# Patient Record
Sex: FEMALE | Race: WHITE | NOT HISPANIC OR LATINO | Employment: FULL TIME | URBAN - METROPOLITAN AREA
[De-identification: names, ages, dates, MRNs, and addresses within clinical notes are randomized per-mention and may not be internally consistent; named-entity substitution may affect disease eponyms.]

---

## 2017-11-30 ENCOUNTER — OFFICE VISIT (OUTPATIENT)
Dept: CARDIOLOGY | Facility: CLINIC | Age: 32
End: 2017-11-30

## 2017-11-30 VITALS
HEIGHT: 69 IN | SYSTOLIC BLOOD PRESSURE: 124 MMHG | WEIGHT: 172 LBS | DIASTOLIC BLOOD PRESSURE: 78 MMHG | BODY MASS INDEX: 25.48 KG/M2 | HEART RATE: 59 BPM

## 2017-11-30 DIAGNOSIS — Q21.12 PFO (PATENT FORAMEN OVALE): ICD-10-CM

## 2017-11-30 DIAGNOSIS — O29.119: Primary | ICD-10-CM

## 2017-11-30 PROCEDURE — 99204 OFFICE O/P NEW MOD 45 MIN: CPT | Performed by: INTERNAL MEDICINE

## 2017-11-30 PROCEDURE — 93000 ELECTROCARDIOGRAM COMPLETE: CPT | Performed by: INTERNAL MEDICINE

## 2017-11-30 NOTE — PROGRESS NOTES
Janett Coronado  1985  Date of Office Visit: 2017  Encounter Provider: Dameon Haynes MD  Place of Service: [facility] CARDIOLOGY      CHIEF COMPLAINT:  Asystolic arrest during delivery  Patent foramen Ovale    HISTORY OF PRESENT ILLNESS:  Dr. Ochoa,     I had the pleasure of seeing your patient, Janett Coronado, as a self-referral today secondary to an asystolic arrest during anesthesia during childbirth.  She was admitted to [facility] in 10/2017.  She underwent an emergent primary low transverse  section.  This was performed secondary to persistent fetal bradycardia and breech presentation.  After the uterus was manipulated, the patient had approximately 30-45 seconds of asystole and per her report received chest compressions.  I am unclear whether she received epinephrine.  She had returned to a normal sinus rhythm.  She was stable postoperatively after that episode.  Since that time, she denies any issues including chest pain or dyspnea on exertion.  Prior to her  section, in July she had had an echocardiogram secondary to what sounds to be symptoms due to migraine headache.  Her echocardiogram revealed a normal left ventricular size and systolic function with an ejection fraction of 65-70%.  She had a small patent foramen ovale with no shunting at baseline, but a positive bubble study with Valsalva.          Review of Systems   Constitution: Negative for fever, weakness and malaise/fatigue.   HENT: Negative for nosebleeds and sore throat.    Eyes: Negative for blurred vision and double vision.   Cardiovascular: Negative for chest pain, claudication, palpitations and syncope.   Respiratory: Negative for cough, shortness of breath and snoring.    Endocrine: Negative for cold intolerance, heat intolerance and polydipsia.   Skin: Negative for itching, poor wound healing and rash.   Musculoskeletal: Negative for joint pain, joint swelling,  "muscle weakness and myalgias.   Gastrointestinal: Negative for abdominal pain, melena, nausea and vomiting.   Neurological: Negative for light-headedness, loss of balance, seizures and vertigo.   Psychiatric/Behavioral: Negative for altered mental status and depression.          Past Medical History:   Diagnosis Date   • Difficulty with speech    • Heart murmur    • Hypertension     during labor   • PFO (patent foramen ovale)    • Urinary tract infection    • Vision changes        The following portions of the patient's history were reviewed and updated as appropriate: Social history , Family history and Surgical history     No current outpatient prescriptions on file prior to visit.     No current facility-administered medications on file prior to visit.        Allergies   Allergen Reactions   • No Known Drug Allergy        Vitals:    11/30/17 1332   BP: 124/78   Pulse: 59   Weight: 172 lb (78 kg)   Height: 69\" (175.3 cm)     Physical Exam   Constitutional: She is oriented to person, place, and time. She appears well-developed and well-nourished.   HENT:   Head: Normocephalic and atraumatic.   Eyes: Conjunctivae and EOM are normal. No scleral icterus.   Neck: Normal range of motion. Neck supple. Normal carotid pulses, no hepatojugular reflux and no JVD present. Carotid bruit is not present. No tracheal deviation present. No thyromegaly present.   Cardiovascular: Normal rate and regular rhythm.  Exam reveals no gallop and no friction rub.    No murmur heard.  Pulses:       Carotid pulses are 2+ on the right side, and 2+ on the left side.       Radial pulses are 2+ on the right side, and 2+ on the left side.        Femoral pulses are 2+ on the right side, and 2+ on the left side.       Dorsalis pedis pulses are 2+ on the right side, and 2+ on the left side.        Posterior tibial pulses are 2+ on the right side, and 2+ on the left side.   Pulmonary/Chest: Breath sounds normal. No respiratory distress. She has no " decreased breath sounds. She has no wheezes. She has no rhonchi. She has no rales. She exhibits no tenderness.   Abdominal: Soft. Bowel sounds are normal. She exhibits no distension. There is no tenderness. There is no rebound.   Musculoskeletal: She exhibits no edema or deformity.   Neurological: She is alert and oriented to person, place, and time. She has normal strength. No sensory deficit.   Skin: No rash noted. No erythema.   Psychiatric: She has a normal mood and affect. Her behavior is normal.     No components found for: CBC  No results found for: CMP  No components found for: LIPID  No results found for: BMP      ECG 12 Lead  Date/Time: 2017 1:55 PM  Performed by: ELBA AMBRIZ  Authorized by: ELBA AMBRIZ   Comparison: compared with previous ECG from 2017  Rhythm: sinus rhythm  Rate: normal  Conduction: conduction normal  ST Segments: ST segments normal  T Waves: T waves normal  QRS axis: normal  Clinical impression: normal ECG            DISCUSSION/SUMMARYEmelyn is a very pleasant 32-year-old female with a medical history of migrainous headaches with aura, patent foramen ovale with no shunting at rest, and a positive bubble study with Valsalva who had what sounds to be an asystolic arrest for a short period of time with manipulation of her uterus during  section and anesthesia.  Since that time, she has been asymptomatic.  She has no significant chest pain or dyspnea on exertion.      Asystolic arrest; most likely due to uterine manipulation and anesthesia.  I will go ahead and get a repeat transthoracic echocardiogram on her.  This would be as she is closer to her delivery date in October and we would want to rule out a peripartum cardiomyopathy that may have occurred around the time of delivery.  In the setting of a normal EKG, I do not think any additional workup should happen after this.

## 2017-12-07 ENCOUNTER — APPOINTMENT (OUTPATIENT)
Dept: CARDIOLOGY | Facility: HOSPITAL | Age: 32
End: 2017-12-07
Attending: INTERNAL MEDICINE

## 2017-12-13 ENCOUNTER — HOSPITAL ENCOUNTER (OUTPATIENT)
Dept: CARDIOLOGY | Facility: HOSPITAL | Age: 32
Discharge: HOME | End: 2017-12-13
Attending: INTERNAL MEDICINE | Admitting: INTERNAL MEDICINE

## 2017-12-13 VITALS
WEIGHT: 172 LBS | SYSTOLIC BLOOD PRESSURE: 120 MMHG | DIASTOLIC BLOOD PRESSURE: 70 MMHG | BODY MASS INDEX: 25.48 KG/M2 | HEART RATE: 54 BPM | HEIGHT: 69 IN

## 2017-12-13 DIAGNOSIS — O29.119: ICD-10-CM

## 2017-12-13 DIAGNOSIS — Q21.12 PFO (PATENT FORAMEN OVALE): ICD-10-CM

## 2017-12-13 LAB
ASCENDING AORTA: 2.8 CM
BH CV ECHO MEAS - ACS: 2.4 CM
BH CV ECHO MEAS - AO MAX PG (FULL): 3.3 MMHG
BH CV ECHO MEAS - AO MAX PG: 6.3 MMHG
BH CV ECHO MEAS - AO MEAN PG (FULL): 2.5 MMHG
BH CV ECHO MEAS - AO MEAN PG: 3.9 MMHG
BH CV ECHO MEAS - AO ROOT AREA (BSA CORRECTED): 1.6
BH CV ECHO MEAS - AO ROOT AREA: 7.3 CM^2
BH CV ECHO MEAS - AO ROOT DIAM: 3 CM
BH CV ECHO MEAS - AO V2 MAX: 125.7 CM/SEC
BH CV ECHO MEAS - AO V2 MEAN: 94.8 CM/SEC
BH CV ECHO MEAS - AO V2 VTI: 30.7 CM
BH CV ECHO MEAS - AVA(I,A): 2.1 CM^2
BH CV ECHO MEAS - AVA(I,D): 2.1 CM^2
BH CV ECHO MEAS - AVA(V,A): 2.4 CM^2
BH CV ECHO MEAS - AVA(V,D): 2.4 CM^2
BH CV ECHO MEAS - BSA(HAYCOCK): 2 M^2
BH CV ECHO MEAS - BSA: 1.9 M^2
BH CV ECHO MEAS - BZI_BMI: 25.4 KILOGRAMS/M^2
BH CV ECHO MEAS - BZI_METRIC_HEIGHT: 175.3 CM
BH CV ECHO MEAS - BZI_METRIC_WEIGHT: 78 KG
BH CV ECHO MEAS - CONTRAST EF (2CH): 52.5 ML/M^2
BH CV ECHO MEAS - CONTRAST EF 4CH: 57.5 ML/M^2
BH CV ECHO MEAS - EDV(MOD-SP2): 80 ML
BH CV ECHO MEAS - EDV(MOD-SP4): 87 ML
BH CV ECHO MEAS - EDV(TEICH): 122.8 ML
BH CV ECHO MEAS - EF(CUBED): 65.1 %
BH CV ECHO MEAS - EF(MOD-SP2): 52.5 %
BH CV ECHO MEAS - EF(MOD-SP4): 57.5 %
BH CV ECHO MEAS - EF(TEICH): 56.3 %
BH CV ECHO MEAS - ESV(MOD-SP2): 38 ML
BH CV ECHO MEAS - ESV(MOD-SP4): 37 ML
BH CV ECHO MEAS - ESV(TEICH): 53.7 ML
BH CV ECHO MEAS - FS: 29.6 %
BH CV ECHO MEAS - IVS/LVPW: 1.2
BH CV ECHO MEAS - IVSD: 1 CM
BH CV ECHO MEAS - LAT PEAK E' VEL: 18 CM/SEC
BH CV ECHO MEAS - LV DIASTOLIC VOL/BSA (35-75): 44.9 ML/M^2
BH CV ECHO MEAS - LV MASS(C)D: 169.9 GRAMS
BH CV ECHO MEAS - LV MASS(C)DI: 87.7 GRAMS/M^2
BH CV ECHO MEAS - LV MAX PG: 3 MMHG
BH CV ECHO MEAS - LV MEAN PG: 1.4 MMHG
BH CV ECHO MEAS - LV SYSTOLIC VOL/BSA (12-30): 19.1 ML/M^2
BH CV ECHO MEAS - LV V1 MAX: 87.3 CM/SEC
BH CV ECHO MEAS - LV V1 MEAN: 53.4 CM/SEC
BH CV ECHO MEAS - LV V1 VTI: 18.4 CM
BH CV ECHO MEAS - LVIDD: 5.1 CM
BH CV ECHO MEAS - LVIDS: 3.6 CM
BH CV ECHO MEAS - LVLD AP2: 8.5 CM
BH CV ECHO MEAS - LVLD AP4: 7.2 CM
BH CV ECHO MEAS - LVLS AP2: 7.6 CM
BH CV ECHO MEAS - LVLS AP4: 6.2 CM
BH CV ECHO MEAS - LVOT AREA (M): 3.5 CM^2
BH CV ECHO MEAS - LVOT AREA: 3.5 CM^2
BH CV ECHO MEAS - LVOT DIAM: 2.1 CM
BH CV ECHO MEAS - LVPWD: 0.86 CM
BH CV ECHO MEAS - MED PEAK E' VEL: 11 CM/SEC
BH CV ECHO MEAS - MR MAX PG: 57.8 MMHG
BH CV ECHO MEAS - MR MAX VEL: 380.1 CM/SEC
BH CV ECHO MEAS - MV A DUR: 0.13 SEC
BH CV ECHO MEAS - MV A MAX VEL: 41.9 CM/SEC
BH CV ECHO MEAS - MV DEC SLOPE: 378.1 CM/SEC^2
BH CV ECHO MEAS - MV DEC TIME: 0.22 SEC
BH CV ECHO MEAS - MV E MAX VEL: 82.5 CM/SEC
BH CV ECHO MEAS - MV E/A: 2
BH CV ECHO MEAS - MV MAX PG: 3.1 MMHG
BH CV ECHO MEAS - MV MEAN PG: 1.1 MMHG
BH CV ECHO MEAS - MV P1/2T MAX VEL: 83.9 CM/SEC
BH CV ECHO MEAS - MV P1/2T: 65 MSEC
BH CV ECHO MEAS - MV V2 MAX: 87.5 CM/SEC
BH CV ECHO MEAS - MV V2 MEAN: 49.3 CM/SEC
BH CV ECHO MEAS - MV V2 VTI: 30.7 CM
BH CV ECHO MEAS - MVA P1/2T LCG: 2.6 CM^2
BH CV ECHO MEAS - MVA(P1/2T): 3.4 CM^2
BH CV ECHO MEAS - MVA(VTI): 2.1 CM^2
BH CV ECHO MEAS - PA ACC TIME: 0.12 SEC
BH CV ECHO MEAS - PA MAX PG (FULL): 2.7 MMHG
BH CV ECHO MEAS - PA MAX PG: 4.3 MMHG
BH CV ECHO MEAS - PA PR(ACCEL): 26.7 MMHG
BH CV ECHO MEAS - PA V2 MAX: 103.2 CM/SEC
BH CV ECHO MEAS - PULM A REVS DUR: 0.1 SEC
BH CV ECHO MEAS - PULM A REVS VEL: 29.3 CM/SEC
BH CV ECHO MEAS - PULM DIAS VEL: 49.9 CM/SEC
BH CV ECHO MEAS - PULM S/D: 1.2
BH CV ECHO MEAS - PULM SYS VEL: 58.6 CM/SEC
BH CV ECHO MEAS - PVA(V,A): 1.7 CM^2
BH CV ECHO MEAS - PVA(V,D): 1.7 CM^2
BH CV ECHO MEAS - QP/QS: 0.53
BH CV ECHO MEAS - RAP SYSTOLE: 3 MMHG
BH CV ECHO MEAS - RV MAX PG: 1.5 MMHG
BH CV ECHO MEAS - RV MEAN PG: 0.78 MMHG
BH CV ECHO MEAS - RV V1 MAX: 61.6 CM/SEC
BH CV ECHO MEAS - RV V1 MEAN: 39.8 CM/SEC
BH CV ECHO MEAS - RV V1 VTI: 11.9 CM
BH CV ECHO MEAS - RVOT AREA: 2.9 CM^2
BH CV ECHO MEAS - RVOT DIAM: 1.9 CM
BH CV ECHO MEAS - RVSP: 25 MMHG
BH CV ECHO MEAS - SI(AO): 115.2 ML/M^2
BH CV ECHO MEAS - SI(CUBED): 44.1 ML/M^2
BH CV ECHO MEAS - SI(LVOT): 33.3 ML/M^2
BH CV ECHO MEAS - SI(MOD-SP2): 21.7 ML/M^2
BH CV ECHO MEAS - SI(MOD-SP4): 25.8 ML/M^2
BH CV ECHO MEAS - SI(TEICH): 35.7 ML/M^2
BH CV ECHO MEAS - SUP REN AO DIAM: 1.3 CM
BH CV ECHO MEAS - SV(AO): 223.2 ML
BH CV ECHO MEAS - SV(CUBED): 85.5 ML
BH CV ECHO MEAS - SV(LVOT): 64.5 ML
BH CV ECHO MEAS - SV(MOD-SP2): 42 ML
BH CV ECHO MEAS - SV(MOD-SP4): 50 ML
BH CV ECHO MEAS - SV(RVOT): 34.5 ML
BH CV ECHO MEAS - SV(TEICH): 69.2 ML
BH CV ECHO MEAS - TAPSE (>1.6): 2.4 CM2
BH CV ECHO MEAS - TR MAX VEL: 269 CM/SEC
BH CV XLRA - RV BASE: 3.2 CM
BH CV XLRA - TDI S': 13 CM/SEC
E/E' RATIO: 6
LEFT ATRIUM VOLUME INDEX: 31 ML/M2
LV EF 2D ECHO EST: 57 %
SINUS: 2.9 CM
STJ: 2.4 CM

## 2017-12-13 PROCEDURE — 0399T HC MYOCARDL STRAIN IMAG QUAN ASSMT PER SESS: CPT

## 2017-12-13 PROCEDURE — 0399T ADULT TRANSTHORACIC ECHO COMPLETE W/ CONT IF NECESSARY PER PROTOCOL: CPT | Performed by: INTERNAL MEDICINE

## 2017-12-13 PROCEDURE — 93303 ECHO TRANSTHORACIC: CPT | Performed by: INTERNAL MEDICINE

## 2017-12-13 PROCEDURE — 93306 TTE W/DOPPLER COMPLETE: CPT

## 2018-06-19 ENCOUNTER — OFFICE VISIT (OUTPATIENT)
Dept: FAMILY MEDICINE CLINIC | Facility: CLINIC | Age: 33
End: 2018-06-19

## 2018-06-19 VITALS
BODY MASS INDEX: 24.88 KG/M2 | WEIGHT: 168 LBS | OXYGEN SATURATION: 98 % | DIASTOLIC BLOOD PRESSURE: 78 MMHG | SYSTOLIC BLOOD PRESSURE: 112 MMHG | HEART RATE: 80 BPM | HEIGHT: 69 IN | RESPIRATION RATE: 16 BRPM | TEMPERATURE: 98.3 F

## 2018-06-19 DIAGNOSIS — R10.32 LEFT INGUINAL PAIN: ICD-10-CM

## 2018-06-19 DIAGNOSIS — J40 BRONCHITIS: Primary | ICD-10-CM

## 2018-06-19 PROCEDURE — 99213 OFFICE O/P EST LOW 20 MIN: CPT | Performed by: FAMILY MEDICINE

## 2018-06-19 RX ORDER — METHYLPREDNISOLONE 4 MG/1
TABLET ORAL
Qty: 21 EACH | Refills: 0 | Status: SHIPPED | OUTPATIENT
Start: 2018-06-19 | End: 2020-02-21

## 2018-06-19 NOTE — PROGRESS NOTES
Problem List Items Addressed This Visit     None      Visit Diagnoses     Bronchitis    -  Primary    Relevant Medications    MethylPREDNISolone (MEDROL, MORE,) 4 MG tablet    Left inguinal pain        Relevant Orders    Ambulatory Referral to Physical Therapy Evaluate and treat          Reassurance regarding lip. Used balm       Return if symptoms worsen or fail to improve.    Janett Coronado is a 32 y.o. female being seen in our office today for Cough (Took zpack, felt better but is now getting worse); Generalized Body Aches; and Nasal Congestion                 She  reports that she has never smoked. She has never used smokeless tobacco. She reports that she drinks alcohol. She reports that she does not use drugs.             HPI She had some difficulty breathing with a recent URI. Was seen in [facility] with this and begun on a Zpack. No fever -- still with a little bit of congestion. Cough is worse in the am when she gets up and the late afternoon to evening. Not taking an meds for it. She is nursing as well. She has an 8 month old. No wheezing but doesn't feel like she is breathing as well. She has been more achey lately, since Sunday. All the symptoms began about two and a half to three weeks ago.              The following portions of the patient's history were reviewed and updated as appropriate:PMHroutine: Social history , Allergies, Current Medications, Active Problem List and Health Maintenance            Review of Systems   Constitutional: Positive for fatigue. Negative for activity change, appetite change, chills, fever and unexpected weight change.   HENT: Positive for postnasal drip. Negative for congestion, ear pain, hearing loss, nosebleeds, rhinorrhea and sore throat.         A little dryness along the lip line on the upper lip, laterally   Eyes: Negative for pain, redness and visual disturbance.   Respiratory: Positive for cough and shortness of breath. Negative for wheezing.    Cardiovascular: Negative  for chest pain, palpitations and leg swelling.   Gastrointestinal: Negative for abdominal pain, blood in stool, constipation, diarrhea, nausea and vomiting.   Endocrine: Negative for cold intolerance and heat intolerance.   Genitourinary: Negative for difficulty urinating, dysuria, frequency, hematuria, pelvic pain, urgency and vaginal discharge.   Musculoskeletal: Negative for back pain and joint swelling. Arthralgias: having some pain in the left inguinal region, sl radiating into the thigh. No bulge, seems to be movement related.    Skin: Negative for rash and wound.   Neurological: Negative for dizziness, weakness, numbness and headaches.   Hematological: Does not bruise/bleed easily.   Psychiatric/Behavioral: Negative for dysphoric mood, sleep disturbance and suicidal ideas. The patient is not nervous/anxious.                  BP Readings from Last 1 Encounters:   06/19/18 112/78     Wt Readings from Last 3 Encounters:   06/19/18 76.2 kg (168 lb)   06/08/18 77.1 kg (170 lb)   12/13/17 78 kg (172 lb)   Body mass index is 24.81 kg/m².                 Physical Exam   Constitutional: She appears well-developed and well-nourished.   HENT:   Head: Normocephalic and atraumatic.   Right Ear: Tympanic membrane, external ear and ear canal normal.   Left Ear: Tympanic membrane, external ear and ear canal normal.   Mouth/Throat: Oropharynx is clear and moist.       Eyes: Conjunctivae are normal. Right eye exhibits no discharge. Left eye exhibits no discharge.   Neck: Normal range of motion. Neck supple. No thyromegaly present.   Cardiovascular: Normal rate, regular rhythm and normal heart sounds.    Pulmonary/Chest: Effort normal and breath sounds normal. No respiratory distress. She has no wheezes. She has no rales.   Lymphadenopathy:     She has no cervical adenopathy.   Skin: Skin is warm and dry.   Psychiatric: She has a normal mood and affect. Judgment normal.   Vitals reviewed.                  [facility] notes

## 2018-07-16 ENCOUNTER — TREATMENT (OUTPATIENT)
Dept: PHYSICAL THERAPY | Facility: CLINIC | Age: 33
End: 2018-07-16

## 2018-07-16 DIAGNOSIS — M25.552 PAIN OF LEFT HIP JOINT: Primary | ICD-10-CM

## 2018-07-16 PROCEDURE — 97161 PT EVAL LOW COMPLEX 20 MIN: CPT | Performed by: PHYSICAL THERAPIST

## 2018-07-16 PROCEDURE — 97110 THERAPEUTIC EXERCISES: CPT | Performed by: PHYSICAL THERAPIST

## 2018-07-16 NOTE — PROGRESS NOTES
Physical Therapy Initial Evaluation and Plan of Care    Patient: Janett Coronado   : 1985  Diagnosis/ICD-10 Code:  Pain of left hip joint [M25.552]  Referring practitioner: Regla Ochoa MD  Past medical Hx reviewed: 2018  Subjective Evaluation    History of Present Illness  Date of onset: 2018  Mechanism of injury: I began having problems in the L hip about 6 months ago with no known injury I can think of.  The hip did gradually worsen over time before I was seen by my doctor to check it out.  The pain seems worse after sitting too long (>60 min) and then standing.  I'll experience pain in the front of the hip that can be very sharp then fades out.    I don't have any issue sleeping, but I can have some discomfort with the first couple of steps of walking.  I can also experience and dull pain in the hip just from being up and walking at times.  I did have a lot of hip pain after both of my pregnancies and I do notice that my hip does pop frequently (several times /day) but the pop isn't painful.   I started taking some IBU this past sat. 18 and it seemed to help.          Patient Occupation: Office job. Full time.  Frequently lifting /carrying some weighted objects up to 30 lbs.     Precautions and Work Restrictions: None Quality of life: excellent    Pain  No pain reported  Current pain ratin  At best pain ratin  At worst pain ratin  Location: L anterior lateral hip.    Quality: dull ache and sharp  Relieving factors: medications (IBU )  Aggravating factors: prolonged positioning and ambulation  Progression: no change    Social Support  Lives in: multiple-level home  Lives with: spouse and young children    Diagnostic Tests  No diagnostic tests performed    Treatments  Previous treatment: chiropractic (For pre birth hip pain)  Patient Goals  Patient goals for therapy: decreased pain, increased strength, return to work, return to sport/leisure activities and independence with  ADLs/IADLs       Objective       Palpation   Left   Tenderness of the rectus femoris and TFL.     Active Range of Motion     Lumbar   Flexion: WFL  Extension: WFL  Left lateral flexion: WFL  Right lateral flexion: WFL  Left rotation: WFL  Right rotation: WFL    Strength/Myotome Testing     Left Hip   Planes of Motion   Flexion: 4+ (slight hip pain)  Extension: 4+  Abduction: 5  Adduction: 5  External rotation: 4  Internal rotation: 5    Isolated Muscles   Gluteus minimus: 4+  TFL: 4+    Right Hip   Planes of Motion   Flexion: 5  Extension: 5  External rotation: 5    Additional Strength Details  VMO test position noted some pain      Tests     Left Hip   Positive scour (slight pain with hip in flexed, adducted and IR. ).   Negative EMMA, femoral nerve tension and Daphne.   Bala: Negative.   Modified Bala: Negative.   SLR: Negative (no pain independently, but with resistance. ).     Ambulation   Weight-Bearing Status   Weight-Bearing Status (Left): full weight bearing   Assistive device used: none    Observational Gait   Gait: within functional limits     Functional Assessment     Single Leg Stance   Left: 30 (on foam: 12 sec.  ) seconds  Right single leg stance time: 12 sec.     Comments  Sit to stand: 10x < 30 sec and no pain in the hip reported.      Assessment & Plan     Assessment  Impairments: activity intolerance, impaired physical strength, lacks appropriate home exercise program and pain with function  Assessment details: Pt presents to PT with symptoms consistent with some hip instability.  She may be experiencing some mm guarding for the hip that is exacerbated by prolonged periods of muscle shortening (sitting) or exertion (prolonged walking).  She has recently given birth and would benefit from stability training of the hip and pelvic girdle to reduce pain and stabilize the hip.    Prognosis: good  Prognosis details: SHORT TERM GOALS: 2 weeks   1.  Patient to be compliant with HEP and demo good  efficiency with TE  2.  Pt will report minimal-no tenderness to palpation with firm pressure.   6.  Pt. Able to ambulate up to 30 min with pain < 3/10 with acceptable pattern.      LONG TERM GOALS: 4-5 weeks   1.  Pt. to score > 100% perceived ability on LEFS  2.  Pain level < 0/10 in hips with all activities including sitting > 60 min continuously.   3.  Hip  AROM to WNL to allow for return to ADL's/IADLS and functional activities without increased symptoms  4. Hip strength to 5/5  to allow for pushing, pulling and more strenuous activities to occur without pain.  Walk > 60 min.  no pain.  5. No palpable tenderness to the hip.   Functional Limitations: walking, uncomfortable because of pain and standing  Goals  Plan Goals:        Plan  Therapy options: will be seen for skilled physical therapy services  Planned modality interventions: cryotherapy, iontophoresis, TENS, thermotherapy (hydrocollator packs) and ultrasound  Planned therapy interventions: abdominal trunk stabilization, body mechanics training, balance/weight-bearing training, flexibility, functional ROM exercises, home exercise program, joint mobilization, manual therapy, neuromuscular re-education, postural training, soft tissue mobilization, spinal/joint mobilization, strengthening, stretching and therapeutic activities  Frequency: 1x week  Duration in weeks: 5  Treatment plan discussed with: patient    Manual Therapy:    -     mins  44155;  Therapeutic Exercise:    20     mins  72016;     Neuromuscular Shabana:    -    mins  93066;    Therapeutic Activity:     -     mins  76961;     Gait Training:      -     mins  58615;     Ultrasound:     -     mins  84637;    Electrical Stimulation:    -     mins  69760 ( );  Dry Needling     -     mins self-pay    Timed Treatment:   20   mins   Total Treatment:     60   mins    PT SIGNATURE: JANNET Atkinson License #: [id number]    DATE TREATMENT INITIATED: 7/16/2018    Initial Certification  Certification  Period: 10/14/2018  I certify that the therapy services are furnished while this patient is under my care.  The services outlined above are required by this patient, and will be reviewed every 90 days.     PHYSICIAN: Regla Ochoa MD      DATE:     Please sign and return via fax to [phone].. Thank you, [facility] Physical Therapy.

## 2018-07-25 ENCOUNTER — TREATMENT (OUTPATIENT)
Dept: PHYSICAL THERAPY | Facility: CLINIC | Age: 33
End: 2018-07-25

## 2018-07-25 DIAGNOSIS — M25.552 PAIN OF LEFT HIP JOINT: Primary | ICD-10-CM

## 2018-07-25 PROCEDURE — 97140 MANUAL THERAPY 1/> REGIONS: CPT | Performed by: PHYSICAL THERAPIST

## 2018-07-25 PROCEDURE — 97110 THERAPEUTIC EXERCISES: CPT | Performed by: PHYSICAL THERAPIST

## 2018-07-25 NOTE — PROGRESS NOTES
Physical Therapy Daily Progress Note  Visits:2    Subjective : Janett Coronado reports: I feel like the exercises have already began to help with my hip pain but it is still there if I sit too long an get up.      Objective:   See Exercise, Manual, and Modality Logs for complete treatment.     Assessment/Plan:Pt tolerated treatment well and indicates that the exercises are beneficial.  Her form and completion were good but required some verbal cues for postures.  She demonstrates L anterior capsule restriction noted in prone position with P-A femoral head glide.  Manual intervention improved symptoms. Will initiate WBing stability training next visit.      Progress per Plan of Care and Progress strengthening /stabilization /functional activity.     Manual Therapy:    10     mins  84783;  Therapeutic Exercise:    35     mins  81363;     Neuromuscular Shabana:    -    mins  00110;    Therapeutic Activity:     -     mins  90209;     Gait Training:      -     mins  88099;     Ultrasound:     -     mins  45470;    Electrical Stimulation:    -     mins  46229 ( );  Dry Needling     -     mins self-pay    Timed Treatment:   45   mins   Total Treatment:     45   mins    JANNET Atkinson License #[id number]    Physical Therapist

## 2018-08-01 ENCOUNTER — TREATMENT (OUTPATIENT)
Dept: PHYSICAL THERAPY | Facility: CLINIC | Age: 33
End: 2018-08-01

## 2018-08-01 DIAGNOSIS — M25.552 PAIN OF LEFT HIP JOINT: Primary | ICD-10-CM

## 2018-08-01 PROCEDURE — 97110 THERAPEUTIC EXERCISES: CPT | Performed by: PHYSICAL THERAPIST

## 2018-08-01 PROCEDURE — 97112 NEUROMUSCULAR REEDUCATION: CPT | Performed by: PHYSICAL THERAPIST

## 2018-08-01 NOTE — PROGRESS NOTES
Physical Therapy Daily Progress Note  Visits:3    Subjective : Janett Coronado reports: I'm doing really well.  No new complaints and the exercises are helping.     Objective   See Exercise, Manual, and Modality Logs for complete treatment.     Assessment/Plan:Today's visit was to focus on progression of dynamic stability training for the hip and pelvis.     Progress per Plan of Care, anticipate D/C next visit      Manual Therapy:    -     mins  83257;  Therapeutic Exercise:    35     mins  48678;     Neuromuscular Shabana:    6    mins  94391;    Therapeutic Activity:     -     mins  30895;     Gait Training:      -     mins  05217;     Ultrasound:     -     mins  04651;    Electrical Stimulation:    -     mins  46433 ( );  Dry Needling     -     mins self-pay    Timed Treatment:   41   mins   Total Treatment:     41   mins    JANNET Atkinson License #[id number]    Physical Therapist

## 2018-08-10 ENCOUNTER — TREATMENT (OUTPATIENT)
Dept: PHYSICAL THERAPY | Facility: CLINIC | Age: 33
End: 2018-08-10

## 2018-08-10 DIAGNOSIS — M25.552 PAIN OF LEFT HIP JOINT: Primary | ICD-10-CM

## 2018-08-10 PROCEDURE — 97110 THERAPEUTIC EXERCISES: CPT | Performed by: PHYSICAL THERAPIST

## 2018-08-10 PROCEDURE — 97530 THERAPEUTIC ACTIVITIES: CPT | Performed by: PHYSICAL THERAPIST

## 2018-08-10 NOTE — PROGRESS NOTES
Physical Therapy Discharge Note  Visits:4    Subjective : Janett Coronado reports: I have not been that great about my exercises this week and I can tell that my hip wasn't as good as it was last week.  No pain in the hip.      Objective:   Strength/Myotome Testing      Left Hip   Planes of Motion   Flexion: 5 (slight hip pain)  Extension: 5  Abduction: 5  Adduction: 5  External rotation: 5  Internal rotation: 5     Isolated Muscles   Gluteus minimus: 5  TFL: 5-  See Exercise, Manual, and Modality Logs for complete treatment.     Assessment & Plan       Goals  Plan Goals: GOALS: 2 weeks (all met)   1.  Patient to be compliant with HEP and demo good efficiency with TE  2.  Pt will report minimal-no tenderness to palpation with firm pressure.   6.  Pt. Able to ambulate up to 30 min with pain < 3/10 with acceptable pattern.      LONG TERM GOALS: 4-5 weeks (all met)   1.  Pt. to score > 100% perceived ability on LEFS  2.  Pain level < 0/10 in hips with all activities including sitting > 60 min continuously.   3.  Hip  AROM to WNL to allow for return to ADL's/IADLS and functional activities without increased symptoms  4. Hip strength to 5/5  to allow for pushing, pulling and more strenuous activities to occur without pain.  Walk > 60 min.  no pain.  5. No palpable tenderness to the hip.     Plan  Therapy options: will not be seen for skilled physical therapy services  Treatment plan discussed with: patient  Plan details: Pt was further educated on work ergonomics to sit in chair that is higher to decrease hip flexion angle for prolonged sitting. She understood the reasoning and was pleased with her progress.        Other: D/C to HEP      Manual Therapy:    -     mins  47524;  Therapeutic Exercise:    40     mins  60188;     Neuromuscular Shabana:    5    mins  62054;    Therapeutic Activity:     8     mins  70538;   Tests, measures, PT education   Gait Training:      -     mins  55163;     Ultrasound:     -     mins  12338;     Electrical Stimulation:    -     mins  48703 ( );  Dry Needling     -     mins self-pay    Timed Treatment:   53   mins   Total Treatment:     53   mins    JANNET Atkinson License #[id number]    Physical Therapist

## 2020-02-21 ENCOUNTER — OFFICE VISIT (OUTPATIENT)
Dept: FAMILY MEDICINE CLINIC | Facility: CLINIC | Age: 35
End: 2020-02-21

## 2020-02-21 VITALS
OXYGEN SATURATION: 99 % | SYSTOLIC BLOOD PRESSURE: 128 MMHG | WEIGHT: 134 LBS | HEIGHT: 69 IN | DIASTOLIC BLOOD PRESSURE: 72 MMHG | BODY MASS INDEX: 19.85 KG/M2 | RESPIRATION RATE: 14 BRPM | HEART RATE: 52 BPM

## 2020-02-21 DIAGNOSIS — R73.9 HYPERGLYCEMIA: ICD-10-CM

## 2020-02-21 DIAGNOSIS — E78.2 MODERATE MIXED HYPERLIPIDEMIA NOT REQUIRING STATIN THERAPY: Primary | ICD-10-CM

## 2020-02-21 LAB
CHOLEST SERPL-MCNC: 226 MG/DL
HDLC SERPL-MCNC: 79 MG/DL (ref 40–60)
LDLC SERPL DIRECT ASSAY-MCNC: 134 MG/DL
TRIGL SERPL-MCNC: 64 MG/DL (ref 0–150)

## 2020-02-21 PROCEDURE — 99213 OFFICE O/P EST LOW 20 MIN: CPT | Performed by: FAMILY MEDICINE

## 2020-02-21 NOTE — PROGRESS NOTES
ASSESSMENT AND PLAN     Problem List Items Addressed This Visit        Other    Hyperglycemia    Overview     OVERVIEW:  Noted 2/21/2020 after 105 level with fasting labs at work           Other Visit Diagnoses     Mild mixed hyperlipidemia not requiring statin therapy    -  Primary    Very mild. Recommend continued exercise and a mediterranean style of eating.           Return in about 1 year (around 2/21/2021), or if symptoms worsen or fail to improve.  Patient was given instructions and counseling regarding her condition or for health maintenance advice. Please see specific information pulled into the AVS if appropriate.          SUBJECTIVE  Janett Coronado is a 34 y.o. female being seen in our office today for discuss lab results from cardiochek/work               Social History  She  reports that she has never smoked. She has never used smokeless tobacco. She reports that she drinks alcohol. She reports that she does not use drugs.    History of the Present Illness   HPI patient concerned about her labs done at work. T Cholesterol was 226 and . She had an excellent HDL. She has lost over 30 lbs since she was last seen here. Is exercising regularly. She feels great but was worried about the numbers.   Significant Past History  The following portions of the patient's history were reviewed and updated as appropriate:PMHroutine: Social history , Allergies, Current Medications, Active Problem List and Health Maintenance    Review of Systems   Constitutional: Negative for activity change, appetite change, chills, fatigue, fever and unexpected weight change.   HENT: Negative for congestion, ear pain, hearing loss, nosebleeds, rhinorrhea and sore throat.    Eyes: Negative for pain, redness and visual disturbance.   Respiratory: Negative for cough, shortness of breath and wheezing.    Cardiovascular: Negative for chest pain, palpitations and leg swelling.   Gastrointestinal: Negative for abdominal pain, blood in  stool, constipation, diarrhea, nausea and vomiting.   Endocrine: Negative for cold intolerance and heat intolerance.   Genitourinary: Negative for difficulty urinating, dysuria, frequency, hematuria, pelvic pain, urgency and vaginal discharge.   Musculoskeletal: Negative for arthralgias, back pain and joint swelling.   Skin: Negative for rash and wound.   Neurological: Negative for dizziness, weakness, numbness and headaches.   Hematological: Does not bruise/bleed easily.   Psychiatric/Behavioral: Negative for dysphoric mood, sleep disturbance and suicidal ideas. The patient is not nervous/anxious.    I have reviewed the ROS as documented by the MA. Regla Ochoa MD      OBJECTIVE  Vital Signs          BP Readings from Last 1 Encounters:   02/21/20 128/72     Wt Readings from Last 3 Encounters:   02/21/20 60.8 kg (134 lb)   06/19/18 76.2 kg (168 lb)   06/08/18 77.1 kg (170 lb)   Body mass index is 19.79 kg/m².     Physical Exam   Constitutional: She appears well-developed and well-nourished.   Psychiatric: She has a normal mood and affect. Her behavior is normal. Judgment and thought content normal.   Vitals reviewed.    Data Reviewed       Recent Results (from the past 2016 hour(s))   Lipid Panel    Collection Time: 02/03/20 12:00 AM   Result Value Ref Range    Total Cholesterol 226 mg/dL    HDL Cholesterol 79 (A) 40 - 60 mg/dL    LDL Cholesterol  134 mg/dL    Triglycerides 64 0 - 150 mg/dL

## 2020-11-06 ENCOUNTER — TELEPHONE (OUTPATIENT)
Dept: FAMILY MEDICINE CLINIC | Facility: CLINIC | Age: 35
End: 2020-11-06

## 2020-11-06 ENCOUNTER — TELEMEDICINE (OUTPATIENT)
Dept: FAMILY MEDICINE CLINIC | Facility: CLINIC | Age: 35
End: 2020-11-06

## 2020-11-06 VITALS — HEIGHT: 69 IN | BODY MASS INDEX: 24.14 KG/M2 | TEMPERATURE: 97.9 F | WEIGHT: 163 LBS

## 2020-11-06 DIAGNOSIS — J02.9 SORE THROAT: Primary | ICD-10-CM

## 2020-11-06 DIAGNOSIS — Z20.822 EXPOSURE TO COVID-19 VIRUS: ICD-10-CM

## 2020-11-06 PROCEDURE — 99212 OFFICE O/P EST SF 10 MIN: CPT | Performed by: FAMILY MEDICINE

## 2020-11-06 NOTE — TELEPHONE ENCOUNTER
Caller: Janett Coronado    Relationship to patient: Self    Best call back number: [phone]    Concerns or Questions if Applicable: PT CANNOT FIND ANYWHERE TO BE TESTED BEFORE Sunday, WANTS TO KNOW IF SHE SHOULD QUARANTINE UNTIL THEN, OR IF WE KNOW OF WHERE SHE COULD GET TESTED QUICKER    Travel screen questions: PT HAS A HEADACHE AND SORE THROAT, NO FEVER, COUGH, SOA, OR LOSS OF TASTE OR SMELL. PT IS UNAWARE OF ANY EXPOSURE WITHIN THE LAST TWO WEEKS.

## 2020-11-06 NOTE — PROGRESS NOTES
Assessment and Plan:     Problem List Items Addressed This Visit     None      Visit Diagnoses     Sore throat    -  Primary    Relevant Orders    COVID-19,[facility] ROUTINE, NP/OP SWAB IN TRANSPORT MEDIA OR ESWAB 72 HR TAT - Swab, Nasopharynx    Exposure to COVID-19 virus        Relevant Orders    COVID-19,[facility] ROUTINE, NP/OP SWAB IN TRANSPORT MEDIA OR ESWAB 72 HR TAT - Swab, Nasopharynx        Patient was given instructions and counseling regarding her condition or for health maintenance advice. Please see specific information pulled into the AVS if appropriate.        Janett Coronado is a 35 y.o. female being seen today for Sore Throat and Headache   History of the Present Illness     No known exposure. Began with HA yesterday and sore throat and some chest discomfort. Kids are in . No fever, no loss of taste and smell. Doesn't want to over-react. Came home from work and would like a test that is read before Monday.       Social History  She  reports that she has never smoked. She has never used smokeless tobacco. She reports current alcohol use. She reports that she does not use drugs.    Review of Systems   Constitutional: Negative for fatigue, fever and unexpected weight change.   HENT: Positive for sore throat.    Respiratory: Negative for cough and shortness of breath.    Cardiovascular: Negative for chest pain.   Neurological: Positive for headaches.   Psychiatric/Behavioral: Negative for dysphoric mood. The patient is not nervous/anxious.    I have reviewed the ROS as documented by the MA. Regla Ochoa MD        Vital Signs          BP Readings from Last 1 Encounters:   02/21/20 128/72     Wt Readings from Last 3 Encounters:   11/06/20 73.9 kg (163 lb)   02/21/20 60.8 kg (134 lb)   06/19/18 76.2 kg (168 lb)   Body mass index is 24.07 kg/m².     Physical Exam  Vitals signs reviewed.   Constitutional:       Appearance: Normal appearance. She is well-developed.   Neurological:      Mental Status:  She is alert.   Psychiatric:         Behavior: Behavior normal.         Thought Content: Thought content normal.         Judgment: Judgment normal.

## 2021-01-08 ENCOUNTER — TELEMEDICINE (OUTPATIENT)
Dept: FAMILY MEDICINE CLINIC | Facility: CLINIC | Age: 36
End: 2021-01-08

## 2021-01-08 VITALS — WEIGHT: 164 LBS | BODY MASS INDEX: 24.29 KG/M2 | HEIGHT: 69 IN

## 2021-01-08 DIAGNOSIS — F48.9 MENTAL HEALTH PROBLEM: Primary | ICD-10-CM

## 2021-01-08 PROCEDURE — 99212 OFFICE O/P EST SF 10 MIN: CPT | Performed by: FAMILY MEDICINE

## 2021-01-08 RX ORDER — BUPROPION HYDROCHLORIDE 150 MG/1
TABLET ORAL
COMMUNITY
Start: 2020-12-31 | End: 2021-07-26

## 2021-01-08 NOTE — PROGRESS NOTES
Assessment and Plan:  {Problem List  Visit Diagnosis  Prob List Tab  Medications  [facility]  [facility]  CC  Quality:23}   Problem List Items Addressed This Visit     None      Patient was given instructions and counseling regarding her condition or for health maintenance advice. Please see specific information pulled into the AVS if appropriate.        Janett Coronado is a 35 y.o. female being seen today for ADHD   Subjective   History of the Present Illness   [facility]. She thinks she may have adult ADD. Recently she was on a support group on line with a support group for non-profit group. EAP spoke to mental health. Met with a psychiatrist. 10 min call. She suggested wellburtrin. She filled it but didn't take it.   Social History  She  reports that she has never smoked. She has never used smokeless tobacco. She reports current alcohol use. She reports that she does not use drugs.  Objective   Vital Signs          BP Readings from Last 1 Encounters:   02/21/20 128/72     Wt Readings from Last 3 Encounters:   01/08/21 74.4 kg (164 lb)   11/06/20 73.9 kg (163 lb)   02/21/20 60.8 kg (134 lb)   Body mass index is 24.22 kg/m².     Physical Exam  Vitals signs reviewed.   Constitutional:       Appearance: Normal appearance. She is well-developed.   Neurological:      Mental Status: She is alert.   Psychiatric:         Behavior: Behavior normal.         Thought Content: Thought content normal.         Judgment: Judgment normal.

## 2021-04-16 ENCOUNTER — BULK ORDERING (OUTPATIENT)
Dept: CASE MANAGEMENT | Facility: OTHER | Age: 36
End: 2021-04-16

## 2021-04-16 DIAGNOSIS — Z23 IMMUNIZATION DUE: ICD-10-CM

## 2021-07-26 ENCOUNTER — OFFICE VISIT (OUTPATIENT)
Dept: FAMILY MEDICINE CLINIC | Facility: CLINIC | Age: 36
End: 2021-07-26

## 2021-07-26 VITALS
WEIGHT: 157 LBS | HEIGHT: 69 IN | HEART RATE: 60 BPM | BODY MASS INDEX: 23.25 KG/M2 | OXYGEN SATURATION: 99 % | RESPIRATION RATE: 16 BRPM | SYSTOLIC BLOOD PRESSURE: 142 MMHG | DIASTOLIC BLOOD PRESSURE: 80 MMHG

## 2021-07-26 DIAGNOSIS — R00.2 PALPITATIONS: Primary | ICD-10-CM

## 2021-07-26 PROCEDURE — 99213 OFFICE O/P EST LOW 20 MIN: CPT | Performed by: FAMILY MEDICINE

## 2021-07-26 PROCEDURE — 93000 ELECTROCARDIOGRAM COMPLETE: CPT | Performed by: FAMILY MEDICINE

## 2021-07-26 NOTE — PROGRESS NOTES
Assessment and Plan     Problem List Items Addressed This Visit     None      Visit Diagnoses     Palpitations    -  Primary    Relevant Orders    Holter monitor - 24 hour    T4    TSH    ECG 12 Lead        Patient was given instructions and counseling regarding her condition or for health maintenance advice. Please see specific information pulled into the AVS if appropriate.        Janett is a 35 y.o. being seen today for  Palpitations   Subjective   History of the Present Illness   Beginning the second week of July she started feeling palpitations and sometimes a rapid heart rate. It would happen less than a minute. Was on vacation but when she came back it continues. She is taking dexadrine but she stopped that. She stopped caffeine and she stopped alcohol. Had two beers on Friday and it seemed to make it worse. Worse at night. FH of father with ablation and a fib.   Social History  She  reports that she has never smoked. She has never used smokeless tobacco. She reports current alcohol use. She reports that she does not use drugs.  Objective   Vital Signs        BP Readings from Last 1 Encounters:   07/26/21 142/80     Wt Readings from Last 3 Encounters:   07/26/21 71.2 kg (157 lb)   01/08/21 74.4 kg (164 lb)   11/06/20 73.9 kg (163 lb)   Body mass index is 23.18 kg/m².     Physical Exam  Vitals reviewed.   Constitutional:       Appearance: Normal appearance. She is well-developed and normal weight.   Cardiovascular:      Rate and Rhythm: Normal rate and regular rhythm.      Heart sounds: Normal heart sounds.   Pulmonary:      Effort: Pulmonary effort is normal.      Breath sounds: Normal breath sounds.   Neurological:      Mental Status: She is alert.   Psychiatric:         Behavior: Behavior normal.         Thought Content: Thought content normal.         Judgment: Judgment normal.               ECG 12 Lead    Date/Time: 7/26/2021 4:12 PM  Performed by: Regla Ochoa MD  Authorized by: Regla Ochoa MD    Comparison: not compared with previous ECG   Previous ECG: no previous ECG available  Rhythm: sinus rhythm  Rate: normal  Conduction: conduction normal  ST Segments: ST segments normal  T Waves: T waves normal  QRS axis: normal  Other: no other findings    Clinical impression: normal ECG

## 2021-07-28 ENCOUNTER — CLINICAL SUPPORT (OUTPATIENT)
Dept: FAMILY MEDICINE CLINIC | Facility: CLINIC | Age: 36
End: 2021-07-28

## 2021-07-28 VITALS — DIASTOLIC BLOOD PRESSURE: 68 MMHG | SYSTOLIC BLOOD PRESSURE: 120 MMHG

## 2022-04-20 ENCOUNTER — OFFICE VISIT (OUTPATIENT)
Dept: FAMILY MEDICINE CLINIC | Facility: CLINIC | Age: 37
End: 2022-04-20

## 2022-04-20 VITALS
HEIGHT: 69 IN | BODY MASS INDEX: 22.36 KG/M2 | RESPIRATION RATE: 16 BRPM | OXYGEN SATURATION: 96 % | SYSTOLIC BLOOD PRESSURE: 118 MMHG | WEIGHT: 151 LBS | HEART RATE: 69 BPM | DIASTOLIC BLOOD PRESSURE: 70 MMHG

## 2022-04-20 DIAGNOSIS — R39.15 URINARY URGENCY: ICD-10-CM

## 2022-04-20 DIAGNOSIS — R10.31 RIGHT INGUINAL PAIN: Primary | ICD-10-CM

## 2022-04-20 PROBLEM — Z86.16 HISTORY OF COVID-19: Status: ACTIVE | Noted: 2022-04-20

## 2022-04-20 PROCEDURE — 99214 OFFICE O/P EST MOD 30 MIN: CPT | Performed by: FAMILY MEDICINE

## 2022-04-20 RX ORDER — DEXTROAMPHETAMINE SULFATE 10 MG/1
15 TABLET ORAL DAILY
COMMUNITY

## 2022-04-20 NOTE — PROGRESS NOTES
Assessment and Plan     Problem List Items Addressed This Visit    None     Visit Diagnoses     Right inguinal pain    -  Primary    Relevant Orders    Ambulatory Referral to General Surgery    Urinary urgency        Relevant Orders    Ambulatory Referral to Physical Therapy Pelvic Floor        Return if symptoms worsen or fail to improve.    Patient was given instructions and counseling regarding her condition or for health maintenance advice. Please see specific information pulled into the AVS if appropriate.        Janett is a 36 y.o. being seen today for  Abdominal Pain (RLQ)   Subjective   History of the Present Illness   Pain in RLQ. She thinks she had it in 2012 -- had a cscope and vaginal ultrasound at that time. At night when she lays down, she feels a lump sometimes. If she is on her left side and she coughs it will sometimes give her a sharp pain, but not always. On and off for years. Nothing obviously causes it. Feels like maybe the left side of her inguinal area puffs out more.   Social History  She  reports that she has never smoked. She has never used smokeless tobacco. She reports current alcohol use. She reports that she does not use drugs.  Objective   Vital Signs        BP Readings from Last 1 Encounters:   04/20/22 118/70     Wt Readings from Last 3 Encounters:   04/20/22 68.5 kg (151 lb)   07/26/21 71.2 kg (157 lb)   01/08/21 74.4 kg (164 lb)   Body mass index is 22.3 kg/m².     Physical Exam  Vitals reviewed.   Constitutional:       Appearance: Normal appearance. She is well-developed and normal weight.      Comments: Mask in place    Cardiovascular:      Rate and Rhythm: Normal rate and regular rhythm.      Heart sounds: Normal heart sounds.   Pulmonary:      Effort: Pulmonary effort is normal.      Breath sounds: Normal breath sounds.   Abdominal:      Palpations: There is no mass.      Tenderness: There is no abdominal tenderness. There is no guarding.   Neurological:      Mental Status: She  is alert.   Psychiatric:         Behavior: Behavior normal.         Thought Content: Thought content normal.         Judgment: Judgment normal.

## 2022-05-02 ENCOUNTER — OFFICE VISIT (OUTPATIENT)
Dept: SURGERY | Facility: CLINIC | Age: 37
End: 2022-05-02

## 2022-05-02 VITALS — HEIGHT: 69 IN | BODY MASS INDEX: 23.19 KG/M2 | WEIGHT: 156.6 LBS

## 2022-05-02 DIAGNOSIS — G89.29 CHRONIC RLQ PAIN: Primary | ICD-10-CM

## 2022-05-02 DIAGNOSIS — R10.31 CHRONIC RLQ PAIN: Primary | ICD-10-CM

## 2022-05-02 PROCEDURE — 99203 OFFICE O/P NEW LOW 30 MIN: CPT | Performed by: SURGERY

## 2022-05-02 NOTE — PROGRESS NOTES
Cc: Right lower quadrant abdominal pain    History of presenting illness:   This is a very nice, generally healthy 36-year-old female who has a longstanding history of intermittent right lower quadrant abdominal pain.  Patient states this goes back at least 8 or 10 years.  Some days the pain will be moderately intense.  Overall it has not changed substantially, but she felt more recently that there was a mass associated with it, most noted when she was laying flat with her abdomen relaxed.  She may go several days at a time without experiencing any discomfort and then experience this pain.  It does not seem to radiate.  It does not seem to be associated with food and is not necessarily made worse with vigorous activity.    Past Medical History: Significant really only for cardiac arrest during  section, no other major chronic medical issues    Past Surgical History:  section, colonoscopy around 8 years ago, orthopedic procedure on her arm for fracture    Medications: Dextroamphetamine    Allergies: None known    Social History: Non-smoker    Family History: Negative for colorectal cancer    Review of Systems:  Constitutional: Negative for fever, chills, change in weight  Neck: no swollen glands or dysphagia or odynophagia  Respiratory: negative for SOB, cough, hemoptysis or wheezing  Cardiovascular: negative for chest pain, palpitations or peripheral edema  Gastrointestinal: Positive for abdominal pain, negative for nausea, vomiting, change in bowel habits      Physical Exam:  BMI: 23.1  General: alert and oriented, appropriate, no acute distress  Eyes: No scleral icterus, extraocular movements are intact  Neck: Supple without lymphadenopathy or thyromegaly, trachea is in the midline  Respiratory: There is good bilateral chest expansion, no use of accessory muscles is noted  Cardiovascular: No jugular venous distention or peripheral edema is seen  Gastrointestinal: Soft, benign, no mass felt, no  hernia, nondistended  Genitourinary: No inguinal hernias felt, no mass, nontender    Laboratory data: No recent relevant data    Imaging data: CT from 2014 reviewed by me and there are no findings in the right lower quadrant      Assessment and plan:   -Right lower quadrant pain, exacerbated  -In the setting of this possible feeling of mass by the patient, and given the fact that the pain persists, I think that repeat imaging would be rhodes.  Although I think that problems like occult hernia, spigelian hernia, chronic appendicitis or other mass are unlikely, I think CT would best rule these out.  If there are no findings, I suspect that this is either functional bowel disease or intermittent musculoskeletal pain.  I have explained to the patient that if there is no finding then no surgical intervention would be recommended.  It is certainly okay to use over-the-counter ibuprofen as needed.      Brandon Chappell MD, FACS  General, Minimally Invasive and Endoscopic Surgery  [facility]    [address], [address]  [place], [place], [address]  P: [phone]  F: [phone]

## 2022-05-13 ENCOUNTER — HOSPITAL ENCOUNTER (OUTPATIENT)
Dept: CT IMAGING | Facility: HOSPITAL | Age: 37
Discharge: HOME | End: 2022-05-13
Admitting: SURGERY

## 2022-05-13 ENCOUNTER — APPOINTMENT (OUTPATIENT)
Dept: CT IMAGING | Facility: HOSPITAL | Age: 37
End: 2022-05-13

## 2022-05-13 DIAGNOSIS — G89.29 CHRONIC RLQ PAIN: ICD-10-CM

## 2022-05-13 DIAGNOSIS — R10.31 CHRONIC RLQ PAIN: ICD-10-CM

## 2022-05-13 PROCEDURE — 74177 CT ABD & PELVIS W/CONTRAST: CPT

## 2022-05-13 PROCEDURE — 0 IOPAMIDOL PER 1 ML: Performed by: SURGERY

## 2022-05-13 PROCEDURE — 0 DIATRIZOATE MEGLUMINE & SODIUM PER 1 ML: Performed by: SURGERY

## 2022-05-13 RX ADMIN — DIATRIZOATE MEGLUMINE AND DIATRIZOATE SODIUM 30 ML: 600; 100 SOLUTION ORAL; RECTAL at 11:30

## 2022-05-13 RX ADMIN — IOPAMIDOL 100 ML: 755 INJECTION, SOLUTION INTRAVENOUS at 13:32

## 2022-05-17 ENCOUNTER — DOCUMENTATION (OUTPATIENT)
Dept: SURGERY | Facility: CLINIC | Age: 37
End: 2022-05-17

## 2022-05-17 NOTE — PROGRESS NOTES
CT reviewed and appears normal.  I called the patient and left a voicemail explaining normal findings.  Left phone number and patient may call back if she has further questions, but there is no reason to think that any surgical intervention would be warranted or appropriate for her.    Brandon Chappell MD  General and Endoscopic Surgery  [facility]    [address], [address]  [place], [place], [address]  P: [phone]  F: [phone]

## 2022-06-07 ENCOUNTER — TELEPHONE (OUTPATIENT)
Dept: SURGERY | Facility: CLINIC | Age: 37
End: 2022-06-07

## 2022-06-07 NOTE — TELEPHONE ENCOUNTER
Patient recently saw you and had a CT scan done. She called and said she has been having sharp pain on her right side and tender to the touch further up than where it was previously. This has been going on for about two weeks. She wanted to see if you could call her to discuss or if she would need to make an office appointment?

## 2022-06-08 DIAGNOSIS — G89.29 CHRONIC RLQ PAIN: Primary | ICD-10-CM

## 2022-06-08 DIAGNOSIS — R10.31 CHRONIC RLQ PAIN: Primary | ICD-10-CM

## 2022-06-08 NOTE — PROGRESS NOTES
Patient called with complaints of right-sided pain.  She seemed to think that it is different than the more chronic right-sided pain she had had.  She describes pain a little bit higher on the belly, to the right of her umbilicus, or even perhaps a little bit higher than that.  The pain has been fairly constant although it seems to be worse with exacerbation.  With coughing is worse.  It does not seem to be particularly associated with food or nausea.  She has had a little bit of pain in her back, although she is not certain that these are related.  I have told her that it still sounds more functional or musculoskeletal to me, but given the fact that it is a little bit higher than what she described previously, I think that perhaps a gallbladder work-up would be reasonable.  I have recommended proceeding with ultrasound and HIDA scan and placed orders for the same.    Brandon Chappell MD  General and Endoscopic Surgery  [facility]    [address], [address]  [place], [place], [address]  P: [phone]  F: [phone]

## 2022-07-01 ENCOUNTER — APPOINTMENT (OUTPATIENT)
Dept: ULTRASOUND IMAGING | Facility: HOSPITAL | Age: 37
End: 2022-07-01

## 2022-07-01 ENCOUNTER — APPOINTMENT (OUTPATIENT)
Dept: NUCLEAR MEDICINE | Facility: HOSPITAL | Age: 37
End: 2022-07-01

## 2022-12-22 ENCOUNTER — HOSPITAL ENCOUNTER (OUTPATIENT)
Dept: ULTRASOUND IMAGING | Facility: HOSPITAL | Age: 37
Discharge: HOME | End: 2022-12-22
Admitting: SURGERY

## 2022-12-22 ENCOUNTER — HOSPITAL ENCOUNTER (OUTPATIENT)
Dept: NUCLEAR MEDICINE | Facility: HOSPITAL | Age: 37
Discharge: HOME | End: 2022-12-22

## 2022-12-22 DIAGNOSIS — R10.31 CHRONIC RLQ PAIN: ICD-10-CM

## 2022-12-22 DIAGNOSIS — G89.29 CHRONIC RLQ PAIN: ICD-10-CM

## 2022-12-22 PROCEDURE — 78227 HEPATOBIL SYST IMAGE W/DRUG: CPT

## 2022-12-22 PROCEDURE — 76705 ECHO EXAM OF ABDOMEN: CPT

## 2022-12-22 PROCEDURE — 25010000002 SINCALIDE PER 5 MCG: Performed by: SURGERY

## 2022-12-22 PROCEDURE — A9537 TC99M MEBROFENIN: HCPCS | Performed by: SURGERY

## 2022-12-22 PROCEDURE — 0 TECHNETIUM TC 99M MEBROFENIN KIT: Performed by: SURGERY

## 2022-12-22 RX ORDER — KIT FOR THE PREPARATION OF TECHNETIUM TC 99M MEBROFENIN 45 MG/10ML
1 INJECTION, POWDER, LYOPHILIZED, FOR SOLUTION INTRAVENOUS
Status: COMPLETED | OUTPATIENT
Start: 2022-12-22 | End: 2022-12-22

## 2022-12-22 RX ADMIN — SODIUM CHLORIDE 1.4 MCG: 9 INJECTION, SOLUTION INTRAVENOUS at 08:37

## 2022-12-22 RX ADMIN — MEBROFENIN 1 DOSE: 45 INJECTION, POWDER, LYOPHILIZED, FOR SOLUTION INTRAVENOUS at 07:30

## 2023-02-21 ENCOUNTER — TELEPHONE (OUTPATIENT)
Dept: FAMILY MEDICINE CLINIC | Facility: CLINIC | Age: 38
End: 2023-02-21

## 2023-02-21 NOTE — TELEPHONE ENCOUNTER
Caller: Janett Coronado    Relationship: Self    Best call back number: [phone]    What was the call regarding: PATIENTS PHYSICAL FOR 02- SCHEDULED WITH BETTE DUCKWORTH DUE TO AVAILABILITY WITH DR PAUL BEING IN June.    Do you require a callback: NO

## 2023-02-27 ENCOUNTER — OFFICE VISIT (OUTPATIENT)
Dept: FAMILY MEDICINE CLINIC | Facility: CLINIC | Age: 38
End: 2023-02-27
Payer: MEDICAID

## 2023-02-27 VITALS
OXYGEN SATURATION: 98 % | DIASTOLIC BLOOD PRESSURE: 68 MMHG | SYSTOLIC BLOOD PRESSURE: 120 MMHG | WEIGHT: 155 LBS | RESPIRATION RATE: 16 BRPM | HEIGHT: 69 IN | HEART RATE: 79 BPM | BODY MASS INDEX: 22.96 KG/M2

## 2023-02-27 DIAGNOSIS — Z00.00 HEALTHCARE MAINTENANCE: Primary | ICD-10-CM

## 2023-02-27 DIAGNOSIS — L30.9 DERMATITIS: ICD-10-CM

## 2023-02-27 PROCEDURE — 2014F MENTAL STATUS ASSESS: CPT | Performed by: FAMILY MEDICINE

## 2023-02-27 PROCEDURE — 99395 PREV VISIT EST AGE 18-39: CPT | Performed by: FAMILY MEDICINE

## 2023-02-27 PROCEDURE — 3008F BODY MASS INDEX DOCD: CPT | Performed by: FAMILY MEDICINE

## 2023-02-27 RX ORDER — MOMETASONE FUROATE 1 MG/G
1 CREAM TOPICAL DAILY
Qty: 15 G | Refills: 0 | Status: SHIPPED | OUTPATIENT
Start: 2023-02-27

## 2023-02-27 NOTE — PROGRESS NOTES
Assessment and Plan     Patient Instructions    Problem List Items Addressed This Visit    None  Visit Diagnoses     Healthcare maintenance    -  Primary    Dermatitis        possibly contact. Try elocon. Disc steroid cream precautions. Derm ref (she will call)    Relevant Medications    mometasone (Elocon) 0.1 % cream             No follow-ups on file.          Janett is a 37 y.o. being seen today for  Annual Exam   Subjective   History of the Present Illness   Annual Exam-Premenopausal:    Janett Coronado 37 y.o.female presents for annual exam.    Health Habits:  Dental Exam. up to date  Are you exercising and if so how much trying to -- circuit training and walking.   She is eating a healthy diet.   The patient does wear seatbelts.  She is wearing sunscreen.  COVID vaccine UTD? yes  Last pap  approximate date 2020 and was normal   Vaccines up to date  This patient has ever been tested for HepC: probably was tested during pregnancy.  Labs results were none needed  Social History  She  reports that she has never smoked. She has never used smokeless tobacco. She reports current alcohol use of about 5.0 standard drinks per week. She reports that she does not use drugs.  Objective   Vital Signs        BP Readings from Last 1 Encounters:   02/27/23 120/68     Wt Readings from Last 3 Encounters:   02/27/23 70.3 kg (155 lb)   05/02/22 71 kg (156 lb 9.6 oz)   04/20/22 68.5 kg (151 lb)   Body mass index is 22.89 kg/m².     Physical Exam  Vitals reviewed.   Constitutional:       General: She is not in acute distress.     Appearance: Normal appearance. She is well-developed.      Comments: Mask in place    HENT:      Head: Normocephalic and atraumatic.      Right Ear: Tympanic membrane, ear canal and external ear normal.      Left Ear: Tympanic membrane, ear canal and external ear normal.      Mouth/Throat:      Comments: Mask in place  Eyes:      Conjunctiva/sclera: Conjunctivae normal.   Neck:      Thyroid: No thyromegaly.       Trachea: No tracheal deviation.   Cardiovascular:      Rate and Rhythm: Normal rate and regular rhythm.      Heart sounds: Normal heart sounds.   Pulmonary:      Effort: Pulmonary effort is normal. No respiratory distress.      Breath sounds: Normal breath sounds. No wheezing or rales.   Chest:   Breasts:     Breasts are symmetrical.      Right: No mass.      Left: No mass.   Abdominal:      General: Bowel sounds are normal. There is no distension.      Palpations: Abdomen is soft.      Tenderness: There is no abdominal tenderness.   Musculoskeletal:         General: No deformity.      Cervical back: Normal range of motion and neck supple.      Right lower leg: No edema.      Left lower leg: No edema.   Lymphadenopathy:      Cervical: No cervical adenopathy.   Skin:     General: Skin is warm and dry.      Findings: Rash present.      Comments: Several small slightly scaley lesions with mild erythema mainly on forehead, right upper eyelid.    Neurological:      Mental Status: She is alert and oriented to person, place, and time.   Psychiatric:         Behavior: Behavior normal.         Thought Content: Thought content normal.         Judgment: Judgment normal.               Patient was given instructions and counseling regarding her condition or for health maintenance advice. Please see specific information pulled into the AVS if appropriate.

## 2023-03-28 ENCOUNTER — TELEPHONE (OUTPATIENT)
Dept: FAMILY MEDICINE CLINIC | Facility: CLINIC | Age: 38
End: 2023-03-28

## 2023-03-28 NOTE — TELEPHONE ENCOUNTER
Spoke to patient she will  what we have on her here and go to one of the travel clinics for the other vaccines she needs

## 2023-03-28 NOTE — TELEPHONE ENCOUNTER
Caller: Janett Coronado    Relationship: Self    Best call back number:[phone]  What is the best time to reach you: ANYTIME  Who are you requesting to speak with (clinical staff, provider,  specific staff member): CLINICAL STAFF  Do you know the name of the person who called: SELF    What was the call regarding:PATIENT CALLED AND IS TRYING TO GET HER VACCINE RECORDS SHE IS GOING TO [facility]. PLEASE CALL TO SEE HOW SHE CAN RETRIEVE THEM.        Do you require a callback: YES

## 2023-05-31 ENCOUNTER — OFFICE VISIT (OUTPATIENT)
Dept: INTERNAL MEDICINE | Facility: CLINIC | Age: 38
End: 2023-05-31

## 2023-05-31 VITALS
BODY MASS INDEX: 23.11 KG/M2 | HEART RATE: 64 BPM | DIASTOLIC BLOOD PRESSURE: 70 MMHG | WEIGHT: 156 LBS | SYSTOLIC BLOOD PRESSURE: 121 MMHG | HEIGHT: 69 IN | OXYGEN SATURATION: 90 %

## 2023-05-31 DIAGNOSIS — Z11.59 ENCOUNTER FOR HEPATITIS C SCREENING TEST FOR LOW RISK PATIENT: Primary | ICD-10-CM

## 2023-05-31 DIAGNOSIS — R53.1 ACUTE LEFT-SIDED WEAKNESS: ICD-10-CM

## 2023-05-31 DIAGNOSIS — R10.9 SIDE PAIN: ICD-10-CM

## 2023-05-31 DIAGNOSIS — F90.9 ATTENTION DEFICIT HYPERACTIVITY DISORDER (ADHD), UNSPECIFIED ADHD TYPE: ICD-10-CM

## 2023-05-31 DIAGNOSIS — G43.809 OTHER MIGRAINE WITHOUT STATUS MIGRAINOSUS, NOT INTRACTABLE: ICD-10-CM

## 2023-05-31 RX ORDER — DEXTROAMPHETAMINE SACCHARATE, AMPHETAMINE ASPARTATE, DEXTROAMPHETAMINE SULFATE AND AMPHETAMINE SULFATE 1.25; 1.25; 1.25; 1.25 MG/1; MG/1; MG/1; MG/1
5 TABLET ORAL 2 TIMES DAILY
Qty: 60 TABLET | Refills: 0 | Status: SHIPPED | OUTPATIENT
Start: 2023-05-31

## 2023-05-31 NOTE — PROGRESS NOTES
Ildefonso Traore M.D.  Internal Medicine  [facility]  [address], [address]  [place] [id number] [phone]      Chief Complaint  Establish Care an episode or left sided numbness    SUBJECTIVE    History of Present Illness    Janett Coronado is a 37 y.o. female who presents to the office today as a new patient to establish care.     ADHD-on dextroamphetamine for 2 years. Felt left sided numbness in March starting in her eye which progressively worsened. Her left arm started to feel weak. The whole episode lasted for 45 minutes-2 hours and occurred multiple times over several weeks.. Arm was not weak enoug to drop. She was working when symptoms occurred.  No inciting events..      She discussed with her prescriber because she was concerned that unilateral numbness and weakness was from her ADHD medication. When she stopped Dextrostat she did not have recurrence of symptoms.. This has been the only medication she had been on for ADHD. She feels more flushed in general    She has history of occular migraines. She has been hospitalized for ocular migraines when she was pregnant    She reports ADHD symptoms since childhood.  She states with dextroamphetamine she had improvement of symptoms and was able to function better at work.    States her heart stopped during a  and she had chest compressions.  She has been evaluated by cardiology.  She had an echo in  that showed normal ejection fraction, mild tricuspid regurgitation, small patent roberson ovale.  She had a Holter monitor in  that was normal.      She had right sided pain for years thaat does not hinder day to day activity. Pain was right lower sided  That comes and goes. Now pain is to the right of umbilicus. Hurts if she moves a certain way. Worse prior to passing gas. No blood in stool or urine. No weight loss.  Pain not related to her periods or bowel movements.    Social-Works at home, part time. In Grad school for  "business. She has 2 kids.     Review of Systems    No Known Allergies     No outpatient medications have been marked as taking for the 23 encounter (Office Visit) with Ildefonso Traore MD.        Past Medical History:   Diagnosis Date   • ADHD (attention deficit hyperactivity disorder)     Diagnosed in    • Difficulty with speech    • Heart murmur    • Hypertension     during labor   • PFO (patent foramen ovale)    • Urinary tract infection     > 5 years prior to 2018   • Vision changes      Past Surgical History:   Procedure Laterality Date   •  SECTION     • COLONOSCOPY     • ORIF ULNAR / RADIAL SHAFT FRACTURE       Family History   Problem Relation Age of Onset   • No Known Problems Mother    • Atrial fibrillation Father    • Hypertension Father    • Alzheimer's disease Maternal Grandmother    • Stroke Maternal Grandmother 70   • Cancer Paternal Grandmother 70        breast cancer    reports that she has never smoked. She has never used smokeless tobacco. She reports current alcohol use of about 5.0 standard drinks per week. She reports that she does not use drugs.    OBJECTIVE    Vital Signs:   /70   Pulse 64   Ht 175.3 cm (69\")   Wt 70.8 kg (156 lb)   SpO2 90%   BMI 23.04 kg/m²     Physical Exam  Constitutional:       Appearance: Normal appearance. She is normal weight.   Cardiovascular:      Rate and Rhythm: Normal rate and regular rhythm.      Heart sounds: Normal heart sounds. No murmur heard.  Pulmonary:      Effort: Pulmonary effort is normal.      Breath sounds: Normal breath sounds.   Abdominal:      General: Abdomen is flat. There is no distension.      Palpations: Abdomen is soft.      Tenderness: There is no abdominal tenderness.   Musculoskeletal:      Right lower leg: No edema.      Left lower leg: No edema.   Skin:     General: Skin is warm and dry.   Neurological:      General: No focal deficit present.      Mental Status: She is alert.      Cranial Nerves: No cranial " nerve deficit.   Psychiatric:         Mood and Affect: Mood normal.         Behavior: Behavior normal.         Thought Content: Thought content normal.            The following data was reviewed by: Ildefonso Traore MD on 05/31/2023:  Common labs        5/31/2023    14:27   Common Labs   Glucose 107     BUN 14     Creatinine 0.80     Sodium 139     Potassium 3.9     Chloride 101     Calcium 9.4     Total Protein 7.5     Albumin 4.7     Total Bilirubin 0.3     Alkaline Phosphatase 87     AST (SGOT) 29     ALT (SGPT) 30     WBC 5.9     Hemoglobin 13.9     Hematocrit 42.5     Platelets 273     Total Cholesterol 222     Triglycerides 86     HDL Cholesterol 95     LDL Cholesterol  112     Hemoglobin A1C 5.7       Data reviewed: Cardiology studies Echo, holter         CT abdomen and pelvis with contrast   05/13/2022     HISTORY:  Lower right abdomen pain and groin pain since 2013.     Sharp and intermittent     COMPARISON:  NONE     TECHNIQUE:    CT of Abdomen and Pelvis with contrast performed.  Sagittal and Coronal  reconstructions performed. Radiation dose reduction techniques included  automated exposure control or exposure modulation based on body size.  Radiation audit for CT and nuclear cardiology exams in the last 12  months: 0.      FINDINGS:    Abdomen:  Lung bases are clear. Study slightly greater by motion. There is a  low-density lesion in the left lobe of liver anteriorly measuring 10 mm  in diameter. Liver is otherwise normal. The gallbladder, spleen,  pancreas, adrenal glands and kidneys are normal in appearance. Aorta is  normal in size. There is no adenopathy. Oral contrast was administered.  The bowel appears normal. There is no hernia.       Pelvis:  Uterus and adnexal regions and bladder are normal. Bones are  unremarkable.       IMPRESSION:  10 mm low-density lesion in the left lobe of liver.  Statistically this is likely a small hemangioma or cyst.    RIGHT UPPER QUADRANT SONOGRAM     HISTORY:  Right-sided abdominal pain.     TECHNIQUE: Right upper quadrant sonogram was performed in standard  fashion. A nuclear medicine HIDA scan was performed today and is  reported separately.     FINDINGS: The visualized part of pancreas appears normal. The right  kidney appears normal and measures 10.7 x 5.5 x 5.5 cm.     The liver contains a 13 mm cyst but otherwise appears normal. There is  no biliary ductal dilatation. The gallbladder is in situ and appears  normal. Common bile duct is within normal limits, 6 mm. No sonographic  Hills sign.     IMPRESSION:  Negative.        NUCLEAR MEDICINE HIDA SCAN WITH PHARMACOLOGIC INTERVENTION     HISTORY: Right upper and lower quadrant abdominal pain.     TECHNIQUE: HIDA scan with gallbladder functional evaluation was  performed using 4.9 mCi technetium Choletec and 1.4 mcg of  cholecystokinin. Correlation: Right upper quadrant sonogram was  performed today and is reported separately..     FINDINGS: The anterior 30 and 60 minute images show normal activity in  the gallbladder, liver, biliary tree, and bowel.     Gallbladder ejection fraction measures 72%.  Normal range is considered  35% or greater.     IMPRESSION:  Negative.    ASSESSMENT & PLAN     Diagnoses and all orders for this visit:    1. Encounter for hepatitis C screening test for low risk patient (Primary)  -     Comprehensive Metabolic Panel  -     CBC & Differential  -     Lipid Panel  -     TSH Rfx On Abnormal To Free T4  -     HCV Antibody Rfx To Qnt PCR    2. Side pain  -     Comprehensive Metabolic Panel  -     CBC & Differential  -     Lipid Panel  -     TSH Rfx On Abnormal To Free T4  -     Hemoglobin A1c    3. Other migraine without status migrainosus, not intractable  -     Comprehensive Metabolic Panel  -     CBC & Differential  -     Lipid Panel  -     TSH Rfx On Abnormal To Free T4  -     Ambulatory Referral to Neurology  -     MRI Brain With & Without Contrast; Future    4. Attention deficit  hyperactivity disorder (ADHD), unspecified ADHD type  -     Comprehensive Metabolic Panel  -     CBC & Differential  -     Lipid Panel  -     TSH Rfx On Abnormal To Free T4  -     amphetamine-dextroamphetamine (Adderall) 5 MG tablet; Take 1 tablet by mouth 2 (Two) Times a Day.  Dispense: 60 tablet; Refill: 0  -     Urine Drug Screen - Urine, Clean Catch    5. Acute left-sided weakness  -     Comprehensive Metabolic Panel  -     CBC & Differential  -     Lipid Panel  -     TSH Rfx On Abnormal To Free T4  -     Ambulatory Referral to Neurology  -     MRI Brain With & Without Contrast; Future    Other orders  -     Interpretation:      37-year-old with history of ADHD and small PFO here today to establish care.  Her primary concern is multiple episodes of transient left-sided facial and upper extremity numbness and left arm weakness in March that resolved when she stopped her dextroamphetamine medication.  Discussed today that this is not a known side effect of Dextrostat.  I think it would probably be safe to restart this however patient would rather try alternative medication.  We will prescribe Adderall 5 mg twice daily and follow-up in 3 months for symptoms.  Augusto reviewed today and appropriate.  Controlled substance agreement signed today.  She has a history of migraines and I think it is probably more likely a hemiplegic migraine however discussed that is important to rule out other neurological issues.    Side pain is not that bothersome to patient and seems musculoskeletal in nature. Consider GI vs GYN workup if worsening.      #Annual Preventative Health Examination   -Age and sex appropriate physical exam performed and documented. Updated past medical, family, social and surgical histories as well as allergies and care team list. Addressed care gaps listed in the medical record.  -Encouraged seat belt use for every car ride for patient and all occupants. Discussed securing of all guns in the home for  patient and family protection. Encouraged sunscreen use to reduce risk of skin cancer for any days with sun exposure over 20 minutes. Recommended helmet if biking or riding motorcycle to prevent head trauma. Discussed the importance of smoke and carbon monoxide detectors in the home.   -Encouraged annual dental and vision exams as part of their overall health.  -Encouraged minimum of 30 minutes or more of exercise at a brisk walk or higher 5 days per week combined with a well-balanced diet.  -Immunizations reviewed and updated in EMR.  -Advised that all women who are planning or capable of pregnancy take a daily supplement containing 0.4 to 0.8 mg (400 to 800 ?g) of folic acid.  The ASCVD Risk score (Lissteh BEDOYA, et al., 2019) failed to calculate for the following reasons:    The 2019 ASCVD risk score is only valid for ages 40 to 79   -Lipid screening:   Lipid Panel        5/31/2023    14:27   Lipid Panel   Total Cholesterol 222     Triglycerides 86     HDL Cholesterol 95     VLDL Cholesterol 15     LDL Cholesterol  112      Will screen for hyperlipidemia today and calculate ASCVD risk if appropriate.    -Aspirin for primary or secondary prevention: Not applicable, patient is less than age 50.  -Depression screening: PHQ2 performed and the patient's screen was negative.  -Diabetes screening:  Screening not indicated at this time.   -Tobacco use screening: Patient denies cigarette use. Tobacco counseling was was not indicated.  -Alcohol use screening: Patient reports drinking 5 drinks per week.. Alcohol abuse counseling was was not indicated.  -Illicit drug screening: Patient does not use illicit drugs.  -Hypertension screening: Patient screened negative for HTN today.  -Hepatitis C virus screening:  Discussed with patient that the USPSTF recommends a one-time screening of hepatitis C in all adults 18-79 years old. Patient accepted screening.  -Colon cancer screening: Patient is less than age 45 and colon cancer  screening is not indicated.  -Lung cancer screening: Patient has never smoked.  -Cervical cancer screening:  Follows with GYN      There are no preventive care reminders to display for this patient.     Follow Up  Return in about 3 months (around 8/31/2023) for Recheck.    Patient/family had no further questions at this time and verbalized understanding of the plan discussed today.

## 2023-06-01 LAB
ALBUMIN SERPL-MCNC: 4.7 G/DL (ref 3.8–4.8)
ALBUMIN/GLOB SERPL: 1.7 {RATIO} (ref 1.2–2.2)
ALP SERPL-CCNC: 87 IU/L (ref 44–121)
ALT SERPL-CCNC: 30 IU/L (ref 0–32)
AMPHETAMINES UR QL SCN: NEGATIVE NG/ML
AST SERPL-CCNC: 29 IU/L (ref 0–40)
BARBITURATES UR QL SCN: NEGATIVE NG/ML
BASOPHILS # BLD AUTO: 0.1 X10E3/UL (ref 0–0.2)
BASOPHILS NFR BLD AUTO: 1 %
BENZODIAZ UR QL SCN: NEGATIVE NG/ML
BILIRUB SERPL-MCNC: 0.3 MG/DL (ref 0–1.2)
BUN SERPL-MCNC: 14 MG/DL (ref 6–20)
BUN/CREAT SERPL: 18 (ref 9–23)
BZE UR QL SCN: NEGATIVE NG/ML
CALCIUM SERPL-MCNC: 9.4 MG/DL (ref 8.7–10.2)
CANNABINOIDS UR QL SCN: NEGATIVE NG/ML
CHLORIDE SERPL-SCNC: 101 MMOL/L (ref 96–106)
CHOLEST SERPL-MCNC: 222 MG/DL (ref 100–199)
CO2 SERPL-SCNC: 24 MMOL/L (ref 20–29)
CREAT SERPL-MCNC: 0.8 MG/DL (ref 0.57–1)
CREAT UR-MCNC: 57.2 MG/DL (ref 20–300)
EGFRCR SERPLBLD CKD-EPI 2021: 97 ML/MIN/1.73
EOSINOPHIL # BLD AUTO: 0.1 X10E3/UL (ref 0–0.4)
EOSINOPHIL NFR BLD AUTO: 1 %
ERYTHROCYTE [DISTWIDTH] IN BLOOD BY AUTOMATED COUNT: 12.2 % (ref 11.7–15.4)
GLOBULIN SER CALC-MCNC: 2.8 G/DL (ref 1.5–4.5)
GLUCOSE SERPL-MCNC: 107 MG/DL (ref 70–99)
HBA1C MFR BLD: 5.7 % (ref 4.8–5.6)
HCT VFR BLD AUTO: 42.5 % (ref 34–46.6)
HCV AB SERPL QL IA: NORMAL
HCV IGG SERPL QL IA: NON REACTIVE
HDLC SERPL-MCNC: 95 MG/DL
HGB BLD-MCNC: 13.9 G/DL (ref 11.1–15.9)
IMM GRANULOCYTES # BLD AUTO: 0 X10E3/UL (ref 0–0.1)
IMM GRANULOCYTES NFR BLD AUTO: 0 %
LABORATORY COMMENT REPORT: NORMAL
LDLC SERPL CALC-MCNC: 112 MG/DL (ref 0–99)
LYMPHOCYTES # BLD AUTO: 1.4 X10E3/UL (ref 0.7–3.1)
LYMPHOCYTES NFR BLD AUTO: 24 %
MCH RBC QN AUTO: 29.8 PG (ref 26.6–33)
MCHC RBC AUTO-ENTMCNC: 32.7 G/DL (ref 31.5–35.7)
MCV RBC AUTO: 91 FL (ref 79–97)
METHADONE UR QL SCN: NEGATIVE NG/ML
MONOCYTES # BLD AUTO: 0.4 X10E3/UL (ref 0.1–0.9)
MONOCYTES NFR BLD AUTO: 7 %
NEUTROPHILS # BLD AUTO: 3.9 X10E3/UL (ref 1.4–7)
NEUTROPHILS NFR BLD AUTO: 67 %
OPIATES UR QL SCN: NEGATIVE NG/ML
OXYCODONE+OXYMORPHONE UR QL SCN: NEGATIVE NG/ML
PCP UR QL: NEGATIVE NG/ML
PH UR: 5.8 [PH] (ref 4.5–8.9)
PLATELET # BLD AUTO: 273 X10E3/UL (ref 150–450)
POTASSIUM SERPL-SCNC: 3.9 MMOL/L (ref 3.5–5.2)
PROPOXYPH UR QL SCN: NEGATIVE NG/ML
PROT SERPL-MCNC: 7.5 G/DL (ref 6–8.5)
RBC # BLD AUTO: 4.67 X10E6/UL (ref 3.77–5.28)
SODIUM SERPL-SCNC: 139 MMOL/L (ref 134–144)
TRIGL SERPL-MCNC: 86 MG/DL (ref 0–149)
TSH SERPL DL<=0.005 MIU/L-ACNC: 2.84 UIU/ML (ref 0.45–4.5)
VLDLC SERPL CALC-MCNC: 15 MG/DL (ref 5–40)
WBC # BLD AUTO: 5.9 X10E3/UL (ref 3.4–10.8)

## 2023-08-01 ENCOUNTER — OFFICE VISIT (OUTPATIENT)
Dept: NEUROLOGY | Facility: CLINIC | Age: 38
End: 2023-08-01
Payer: MEDICAID

## 2023-08-01 VITALS
OXYGEN SATURATION: 100 % | BODY MASS INDEX: 24.04 KG/M2 | RESPIRATION RATE: 18 BRPM | DIASTOLIC BLOOD PRESSURE: 76 MMHG | WEIGHT: 162.3 LBS | SYSTOLIC BLOOD PRESSURE: 120 MMHG | HEART RATE: 54 BPM | HEIGHT: 69 IN

## 2023-08-01 DIAGNOSIS — G93.5 CHIARI I MALFORMATION: ICD-10-CM

## 2023-08-01 DIAGNOSIS — R20.2 NUMBNESS AND TINGLING IN LEFT ARM: Primary | ICD-10-CM

## 2023-08-01 DIAGNOSIS — R20.0 NUMBNESS AND TINGLING IN LEFT ARM: Primary | ICD-10-CM

## 2023-08-01 PROCEDURE — 99204 OFFICE O/P NEW MOD 45 MIN: CPT | Performed by: PSYCHIATRY & NEUROLOGY

## 2023-08-01 PROCEDURE — 1160F RVW MEDS BY RX/DR IN RCRD: CPT | Performed by: PSYCHIATRY & NEUROLOGY

## 2023-08-01 PROCEDURE — 1159F MED LIST DOCD IN RCRD: CPT | Performed by: PSYCHIATRY & NEUROLOGY

## 2023-08-10 ENCOUNTER — OFFICE VISIT (OUTPATIENT)
Dept: OBSTETRICS AND GYNECOLOGY | Facility: CLINIC | Age: 38
End: 2023-08-10
Payer: MEDICAID

## 2023-08-10 ENCOUNTER — PATIENT ROUNDING (BHMG ONLY) (OUTPATIENT)
Dept: NEUROLOGY | Facility: CLINIC | Age: 38
End: 2023-08-10
Payer: MEDICAID

## 2023-08-10 VITALS
WEIGHT: 161.2 LBS | HEIGHT: 69 IN | SYSTOLIC BLOOD PRESSURE: 127 MMHG | DIASTOLIC BLOOD PRESSURE: 77 MMHG | BODY MASS INDEX: 23.88 KG/M2

## 2023-08-10 DIAGNOSIS — R39.15 URINARY URGENCY: ICD-10-CM

## 2023-08-10 DIAGNOSIS — R10.2 PELVIC PAIN: ICD-10-CM

## 2023-08-10 DIAGNOSIS — Z01.419 WOMEN'S ANNUAL ROUTINE GYNECOLOGICAL EXAMINATION: Primary | ICD-10-CM

## 2023-08-10 LAB
BILIRUB BLD-MCNC: NEGATIVE MG/DL
CLARITY, POC: CLEAR
COLOR UR: YELLOW
GLUCOSE UR STRIP-MCNC: NEGATIVE MG/DL
KETONES UR QL: NEGATIVE
LEUKOCYTE EST, POC: NEGATIVE
NITRITE UR-MCNC: NEGATIVE MG/ML
PH UR: 7 [PH] (ref 5–8)
PROT UR STRIP-MCNC: NEGATIVE MG/DL
RBC # UR STRIP: NEGATIVE /UL
SP GR UR: 1.01 (ref 1–1.03)
UROBILINOGEN UR QL: NORMAL

## 2023-08-10 NOTE — PROGRESS NOTES
GYN Annual Exam     Chief Complaint   Patient presents with    Annual Exam     Pap-      HPI    Janett Coronado is a 37 y.o. female who presents for annual well woman exam with a problem.  She is sexually active. Periods are regular every 28-30 days, lasting 5 days. Dysmenorrhea:none. Cyclic symptoms include none. No intermenstrual bleeding, spotting, or discharge. Performing SBE:completes occas.  She is not currently breastfeeding    C/o RLQ pain for over one year. This pain will come and go. Pain is worsened after menses begins. She did f/u with PCP for this in May 2022 with normal CT. She has been having urinary urgency for several years. She is not leaking urine typically, however she will rarely have some leakage with stress. She does not treat pain with any medications. Rates pain at 3-4 on pain scale.     This is my first time meeting Janett Coronado  She is new to our office, prior care with [facility] in [place]   OB History          2    Para        Term                AB        Living   2         SAB        IAB        Ectopic        Molar        Multiple        Live Births                    LMP- 2023  Current contraception: vasectomy  Last Pap- she thinks  normal per pt. I am unable to see results  History of abnormal Pap smear: yes, prior colposcopy, denies hx dysplasia. She thinks last abnormal was   History of STD-denies  Family history of uterine, colon or ovarian cancer: no  Family history of breast cancer: yes - PGM  Mammogram- never  Gardasil Vaccine: did not complete    Past Medical History:   Diagnosis Date    Abnormal Pap smear of cervix     ADHD (attention deficit hyperactivity disorder)     Diagnosed in     Difficulty with speech     Heart murmur     Hypertension     during labor    PFO (patent foramen ovale)     Urinary tract infection     > 5 years prior to 2018    Vision changes        Past Surgical History:   Procedure Laterality Date     " SECTION      COLONOSCOPY      ORIF ULNAR / RADIAL SHAFT FRACTURE      WISDOM TOOTH EXTRACTION           Current Outpatient Medications:     amphetamine-dextroamphetamine (Adderall) 5 MG tablet, Take 1 tablet by mouth 2 (Two) Times a Day., Disp: 60 tablet, Rfl: 0    No Known Allergies    Social History     Tobacco Use    Smoking status: Never    Smokeless tobacco: Never   Vaping Use    Vaping Use: Never used   Substance Use Topics    Alcohol use: Yes     Alcohol/week: 5.0 standard drinks     Types: 5 Glasses of wine per week     Comment: Occ    Drug use: No       Family History   Problem Relation Age of Onset    Atrial fibrillation Father     Hypertension Father     No Known Problems Mother     Breast cancer Paternal Grandmother     Cancer Paternal Grandmother 70        breast cancer    Alzheimer's disease Maternal Grandmother     Stroke Maternal Grandmother 70       Review of Systems   Constitutional:  Negative for chills, fatigue and fever.   Gastrointestinal:  Negative for abdominal distention, abdominal pain, nausea and vomiting.   Genitourinary:  Positive for pelvic pain and urgency. Negative for breast discharge, breast lump, breast pain, dyspareunia, dysuria, frequency, menstrual problem, pelvic pressure, vaginal bleeding, vaginal discharge and vaginal pain.   Musculoskeletal:  Negative for gait problem.   Skin:  Negative for rash.   Neurological:  Negative for dizziness and headache.   Psychiatric/Behavioral:  Negative for behavioral problems.      /77   Ht 175.3 cm (69\")   Wt 73.1 kg (161 lb 3.2 oz)   LMP 2023   BMI 23.81 kg/mý     Physical Exam  Constitutional:       General: She is not in acute distress.     Appearance: Normal appearance. She is not ill-appearing, toxic-appearing or diaphoretic.   Genitourinary:      Vulva, bladder and urethral meatus normal.      No lesions in the vagina.      Right Labia: No rash, tenderness, lesions, skin changes or Bartholin's cyst.     Left " Labia: No tenderness, lesions, skin changes, Bartholin's cyst or rash.     No labial fusion noted.      No inguinal adenopathy present in the right or left side.     No vaginal discharge, erythema, tenderness, bleeding or ulceration.      No vaginal prolapse present.     No vaginal atrophy present.       Right Adnexa: not tender, not full, not palpable, no mass present and not absent.     Left Adnexa: not tender, not full, not palpable, no mass present and not absent.     No cervical motion tenderness, discharge, friability, lesion, polyp, nabothian cyst or eversion.      Uterus is not enlarged, fixed, tender, irregular or prolapsed.      No uterine mass detected.     No urethral tenderness or mass present.      Pelvic exam was performed with patient in the lithotomy position.   Breasts:     Breasts are symmetrical.      Right: Present. No swelling, bleeding, inverted nipple, mass, nipple discharge, skin change, tenderness or breast implant.      Left: Present. No swelling, bleeding, inverted nipple, mass, nipple discharge, skin change, tenderness or breast implant.   HENT:      Head: Normocephalic and atraumatic.   Eyes:      Pupils: Pupils are equal, round, and reactive to light.   Cardiovascular:      Rate and Rhythm: Normal rate.   Pulmonary:      Effort: Pulmonary effort is normal.   Abdominal:      General: There is no distension.      Palpations: Abdomen is soft. There is no mass.      Tenderness: There is no abdominal tenderness. There is no guarding.      Hernia: No hernia is present. There is no hernia in the left inguinal area or right inguinal area.   Musculoskeletal:         General: Normal range of motion.      Cervical back: Normal range of motion and neck supple. No tenderness.   Lymphadenopathy:      Cervical: No cervical adenopathy.      Upper Body:      Right upper body: No supraclavicular, axillary or pectoral adenopathy.      Left upper body: No supraclavicular, axillary or pectoral adenopathy.       Lower Body: No right inguinal adenopathy. No left inguinal adenopathy.   Neurological:      General: No focal deficit present.      Mental Status: She is alert and oriented to person, place, and time.      Cranial Nerves: No cranial nerve deficit.   Skin:     General: Skin is warm and dry.   Psychiatric:         Mood and Affect: Mood normal.         Behavior: Behavior normal.         Thought Content: Thought content normal.         Judgment: Judgment normal.   Vitals and nursing note reviewed.     Assessment   Diagnoses and all orders for this visit:    1. Women's annual routine gynecological examination (Primary)  -     IGP, Apt HPV,rfx 16 / 18,45    2. Pelvic pain  -     NuSwab VG+ - Swab, Vagina  -     Urine Culture - Urine, Urine, Clean Catch    3. Urinary urgency  -     Urine Culture - Urine, Urine, Clean Catch  -     POC Urinalysis Dipstick         Plan   Well woman exam: Pap collected Yes. Recommend MVI daily.    Contraception: vasectomy   STD: Enc condoms. Desires STD screen today- No.   Smoking status: nonsmoker   Encouraged annual mammogram screening starting at age 40. Instructed on how to perform SBE. Encouraged breast health self awareness.  6.    Encouraged 150 minutes of exercise per week if not medially contraindicated.   7.    BMI is within normal parameters. No other follow-up for BMI required.  8.    Discussed work up for pelvic pain including vaginal and urine cultures, TVUS. She has no pain with bimanual exam. Pelvic floor muscles are without pain or hypertonicity.  Reviewed CT scan from May 2022 at that time showed normal uterus and ovaries. Discussed possible causes, including but not limited to ovarian cyst, pelvic infection, GI, , or musculoskeletal in nature. Encouraged to keep a food diary and monitor/document when pain occurs. Discussed referral to GI or pelvic floor PT if normal cultures and TVUS.   9.  Urinary urgency: checking UA and culture today.     Follow Up 2 weeks, one  year or PRN    Madalyn Whitehead, APRN  8/10/2023  11:42 EDT

## 2023-08-12 LAB
BACTERIA UR CULT: NO GROWTH
BACTERIA UR CULT: NORMAL

## 2023-08-13 LAB
A VAGINAE DNA VAG QL NAA+PROBE: NORMAL SCORE
BVAB2 DNA VAG QL NAA+PROBE: NORMAL SCORE
C ALBICANS DNA VAG QL NAA+PROBE: NEGATIVE
C GLABRATA DNA VAG QL NAA+PROBE: NEGATIVE
C TRACH DNA VAG QL NAA+PROBE: NEGATIVE
MEGA1 DNA VAG QL NAA+PROBE: NORMAL SCORE
N GONORRHOEA DNA VAG QL NAA+PROBE: NEGATIVE
T VAGINALIS DNA VAG QL NAA+PROBE: NEGATIVE

## 2023-08-14 ENCOUNTER — PATIENT ROUNDING (BHMG ONLY) (OUTPATIENT)
Dept: OBSTETRICS AND GYNECOLOGY | Facility: CLINIC | Age: 38
End: 2023-08-14
Payer: MEDICAID

## 2023-08-14 ENCOUNTER — PATIENT MESSAGE (OUTPATIENT)
Dept: OBSTETRICS AND GYNECOLOGY | Facility: CLINIC | Age: 38
End: 2023-08-14
Payer: MEDICAID

## 2023-08-14 NOTE — PROGRESS NOTES
My chart message has been sent to the patient for PATIENT ROUNDING with [facility].

## 2023-08-16 LAB
CYTOLOGIST CVX/VAG CYTO: ABNORMAL
CYTOLOGY CVX/VAG DOC CYTO: ABNORMAL
CYTOLOGY CVX/VAG DOC THIN PREP: ABNORMAL
DX ICD CODE: ABNORMAL
DX ICD CODE: ABNORMAL
HIV 1 & 2 AB SER-IMP: ABNORMAL
HPV I/H RISK 4 DNA CVX QL PROBE+SIG AMP: NEGATIVE
OTHER STN SPEC: ABNORMAL
PATHOLOGIST CVX/VAG CYTO: ABNORMAL
RECOM F/U CVX/VAG CYTO: ABNORMAL
STAT OF ADQ CVX/VAG CYTO-IMP: ABNORMAL

## 2023-08-17 ENCOUNTER — TELEPHONE (OUTPATIENT)
Dept: OBSTETRICS AND GYNECOLOGY | Facility: CLINIC | Age: 38
End: 2023-08-17
Payer: MEDICAID

## 2023-08-17 NOTE — TELEPHONE ENCOUNTER
I called Janett Coronado to review pap smear results. No answer, left VM to return my call.    Pap smear returned with atypical cells, this was negative for HPV. ASCCP guidelines recommend repeating pap smear in 3 years.     Madalyn Whitehead, VANCE  8/17/2023  3:40pm

## 2023-08-23 ENCOUNTER — TELEPHONE (OUTPATIENT)
Dept: OBSTETRICS AND GYNECOLOGY | Facility: CLINIC | Age: 38
End: 2023-08-23
Payer: MEDICAID

## 2023-08-23 DIAGNOSIS — F90.9 ATTENTION DEFICIT HYPERACTIVITY DISORDER (ADHD), UNSPECIFIED ADHD TYPE: ICD-10-CM

## 2023-08-23 RX ORDER — DEXTROAMPHETAMINE SACCHARATE, AMPHETAMINE ASPARTATE, DEXTROAMPHETAMINE SULFATE AND AMPHETAMINE SULFATE 1.25; 1.25; 1.25; 1.25 MG/1; MG/1; MG/1; MG/1
5 TABLET ORAL 2 TIMES DAILY
Qty: 60 TABLET | Refills: 0 | Status: SHIPPED | OUTPATIENT
Start: 2023-08-23

## 2023-08-23 NOTE — TELEPHONE ENCOUNTER
Caller: Janett Coronado    Relationship: Self    Best call back number: [phone]     Requested Prescriptions:   Requested Prescriptions     Pending Prescriptions Disp Refills    amphetamine-dextroamphetamine (Adderall) 5 MG tablet 60 tablet 0     Sig: Take 1 tablet by mouth 2 (Two) Times a Day.        Pharmacy where request should be sent: [facility] [id number] [place] [address] [address] AT [address] - [phone]  - [phone] FX     Last office visit with prescribing clinician: 5/31/2023   Last telemedicine visit with prescribing clinician: Visit date not found   Next office visit with prescribing clinician: 8/31/2023     Madalyn Shcmitz Rep   08/23/23 13:35 EDT

## 2023-08-24 ENCOUNTER — OFFICE VISIT (OUTPATIENT)
Dept: OBSTETRICS AND GYNECOLOGY | Facility: CLINIC | Age: 38
End: 2023-08-24
Payer: MEDICAID

## 2023-08-24 VITALS
DIASTOLIC BLOOD PRESSURE: 67 MMHG | WEIGHT: 161 LBS | BODY MASS INDEX: 23.85 KG/M2 | SYSTOLIC BLOOD PRESSURE: 109 MMHG | HEIGHT: 69 IN

## 2023-08-24 DIAGNOSIS — R39.15 URINARY URGENCY: ICD-10-CM

## 2023-08-24 DIAGNOSIS — R10.2 PELVIC PAIN: Primary | ICD-10-CM

## 2023-08-24 NOTE — PROGRESS NOTES
"Chief Complaint   Patient presents with    Follow-up     Discuss u/s, pt states still having pelvic pain         SUBJECTIVE:     Jantet Coronado is a 37 y.o.  who presents to f/u on pelvic pain. TUVS completed today. C/o RLQ pain for over one year. Pain is intermittent    Past Medical History:   Diagnosis Date    Abnormal Pap smear of cervix     ADHD (attention deficit hyperactivity disorder)     Diagnosed in     Difficulty with speech     Heart murmur     Hypertension     during labor    PFO (patent foramen ovale)     Urinary tract infection     > 5 years prior to 2018    Vision changes       Past Surgical History:   Procedure Laterality Date     SECTION      COLONOSCOPY      ORIF ULNAR / RADIAL SHAFT FRACTURE      WISDOM TOOTH EXTRACTION          OBJECTIVE:   Vitals:    23 1107   BP: 109/67   Weight: 73 kg (161 lb)   Height: 175.3 cm (69\")        Physical Exam  Constitutional:       General: She is not in acute distress.     Appearance: Normal appearance. She is not ill-appearing, toxic-appearing or diaphoretic.   Cardiovascular:      Rate and Rhythm: Normal rate.   Pulmonary:      Effort: Pulmonary effort is normal.   Musculoskeletal:         General: Normal range of motion.      Cervical back: Normal range of motion.   Neurological:      General: No focal deficit present.      Mental Status: She is alert and oriented to person, place, and time.   Skin:     General: Skin is warm and dry.   Psychiatric:         Mood and Affect: Mood normal.         Behavior: Behavior normal.         Thought Content: Thought content normal.         Judgment: Judgment normal.   Vitals and nursing note reviewed.       Assessment/Plan    Diagnoses and all orders for this visit:    1. Pelvic pain (Primary)  -     Ambulatory Referral to Physical Therapy    2. Urinary urgency  -     Ambulatory Referral to Physical Therapy    Reviewed normal urine and vaginal cultures with pt  Reviewed normal TVUS today   In the " past we discussed GI as a possible source and discussed keeping a food diary. Discussed referral to GI  We also discussed referral to PT. She also has been experiencing urinary urgency and at this time would like to work with PT on both issues. PT referral entered. She will call if she would also like a referral to GI    Return if symptoms worsen or fail to improve.    I spent 20 minutes caring for Janett on this date of service. This time includes time spent by me in the following activities: preparing for the visit, reviewing tests, obtaining and/or reviewing a separately obtained history, performing a medically appropriate examination and/or evaluation, counseling and educating the patient/family/caregiver, ordering medications, tests, or procedures, referring and communicating with other health care professionals, and documenting information in the medical record    Madalyn Whitehead, VANCE  8/24/2023  12:08 EDT

## 2023-08-29 ENCOUNTER — HOSPITAL ENCOUNTER (OUTPATIENT)
Dept: MRI IMAGING | Facility: HOSPITAL | Age: 38
Discharge: HOME | End: 2023-08-29
Admitting: PSYCHIATRY & NEUROLOGY
Payer: MEDICAID

## 2023-08-29 DIAGNOSIS — R20.2 NUMBNESS AND TINGLING IN LEFT ARM: ICD-10-CM

## 2023-08-29 DIAGNOSIS — R20.0 NUMBNESS AND TINGLING IN LEFT ARM: ICD-10-CM

## 2023-08-29 DIAGNOSIS — G93.5 CHIARI I MALFORMATION: ICD-10-CM

## 2023-08-29 PROCEDURE — 72156 MRI NECK SPINE W/O & W/DYE: CPT

## 2023-08-29 PROCEDURE — A9577 INJ MULTIHANCE: HCPCS | Performed by: PSYCHIATRY & NEUROLOGY

## 2023-08-29 PROCEDURE — 0 GADOBENATE DIMEGLUMINE 529 MG/ML SOLUTION: Performed by: PSYCHIATRY & NEUROLOGY

## 2023-08-29 RX ADMIN — GADOBENATE DIMEGLUMINE 14 ML: 529 INJECTION, SOLUTION INTRAVENOUS at 11:33

## 2023-08-31 ENCOUNTER — OFFICE VISIT (OUTPATIENT)
Dept: INTERNAL MEDICINE | Facility: CLINIC | Age: 38
End: 2023-08-31
Payer: MEDICAID

## 2023-08-31 VITALS
OXYGEN SATURATION: 99 % | SYSTOLIC BLOOD PRESSURE: 127 MMHG | HEART RATE: 70 BPM | BODY MASS INDEX: 24.32 KG/M2 | WEIGHT: 164.2 LBS | HEIGHT: 69 IN | DIASTOLIC BLOOD PRESSURE: 75 MMHG

## 2023-08-31 DIAGNOSIS — F90.9 ATTENTION DEFICIT HYPERACTIVITY DISORDER (ADHD), UNSPECIFIED ADHD TYPE: Primary | ICD-10-CM

## 2023-08-31 DIAGNOSIS — H93.8X1 ABNORMAL SENSATION IN RIGHT EAR: ICD-10-CM

## 2023-08-31 PROCEDURE — 99213 OFFICE O/P EST LOW 20 MIN: CPT | Performed by: STUDENT IN AN ORGANIZED HEALTH CARE EDUCATION/TRAINING PROGRAM

## 2023-08-31 NOTE — PROGRESS NOTES
Ildefonso Traore M.D.  Internal Medicine  [facility]  [address], [address]  [place] [address]  [phone]      Chief Complaint  Follow-up (F/U /)    SUBJECTIVE    History of Present Illness    Janett Coronado is a 38 y.o. female who presents to the office today as an established patient that last saw me on 2023.     ADHD-previously on dextroamphetamine for 2 years. Felt left sided numbness in March starting in her eye which progressively worsened.She was switched to Adderrall. Only takes Adderrall once a day. She is job searching. Coffee in the AM helps to. She is kaylen to concentrate and complete tasks. She is not having re-occurring numbness.        States her heart stopped during a  and she had chest compressions.  She has been evaluated by cardiology.  She had an echo in  that showed normal ejection fraction, mild tricuspid regurgitation, small patent roberson ovale.  She had a Holter monitor in  that was normal. Denies heart racing, chest pain or pressure with Adderrall.         She had right sided pain for years thaat does not hinder day to day activity. Pain was right lower sided  That comes and goes. Now pain is to the right of umbilicus. Hurts if she moves a certain way. Worse prior to passing gas. No blood in stool or urine. No weight loss.  Pain not related to her periods or bowel movements.   She saw GYN who performed pelvic US that was normal. She is doing PT. She is doing pelvic floor therapy. This is helping.       Right ear discomfort since going to an event with loud music in May.     Review of Systems    No Known Allergies     Outpatient Medications Marked as Taking for the 23 encounter (Office Visit) with Ildefonso Traore MD   Medication Sig Dispense Refill    amphetamine-dextroamphetamine (Adderall) 5 MG tablet Take 1 tablet by mouth 2 (Two) Times a Day. (Patient taking differently: Take 1 tablet by mouth Daily.) 60 tablet 0        Past Medical  "History:   Diagnosis Date    Abnormal Pap smear of cervix     ADHD (attention deficit hyperactivity disorder)     Diagnosed in     Difficulty with speech     Heart murmur     Hypertension     during labor    PFO (patent foramen ovale)     Urinary tract infection     > 5 years prior to 2018    Vision changes      Past Surgical History:   Procedure Laterality Date     SECTION      COLONOSCOPY      ORIF ULNAR / RADIAL SHAFT FRACTURE      WISDOM TOOTH EXTRACTION       Family History   Problem Relation Age of Onset    Atrial fibrillation Father     Hypertension Father     No Known Problems Mother     Breast cancer Paternal Grandmother     Cancer Paternal Grandmother 70        breast cancer    Alzheimer's disease Maternal Grandmother     Stroke Maternal Grandmother 70    reports that she has never smoked. She has never used smokeless tobacco. She reports current alcohol use of about 5.0 standard drinks per week. She reports that she does not use drugs.    OBJECTIVE    Vital Signs:   /75   Pulse 70   Ht 175.3 cm (69.02\")   Wt 74.5 kg (164 lb 3.2 oz)   SpO2 99%   BMI 24.24 kg/mý     Physical Exam  Constitutional:       Appearance: Normal appearance. She is normal weight.   HENT:      Right Ear: Tympanic membrane normal.      Left Ear: Tympanic membrane normal.   Cardiovascular:      Rate and Rhythm: Normal rate and regular rhythm.      Heart sounds: Normal heart sounds. No murmur heard.  Pulmonary:      Effort: Pulmonary effort is normal.      Breath sounds: Normal breath sounds.   Skin:     General: Skin is warm and dry.   Neurological:      Mental Status: She is alert.   Psychiatric:         Mood and Affect: Mood normal.         Behavior: Behavior normal.         Thought Content: Thought content normal.          The following data was reviewed by: Ildefonso Traore MD on 2023:  Common labs          2023    14:27   Common Labs   Glucose 107    BUN 14    Creatinine 0.80    Sodium 139  "   Potassium 3.9    Chloride 101    Calcium 9.4    Total Protein 7.5    Albumin 4.7    Total Bilirubin 0.3    Alkaline Phosphatase 87    AST (SGOT) 29    ALT (SGPT) 30    WBC 5.9    Hemoglobin 13.9    Hematocrit 42.5    Platelets 273    Total Cholesterol 222    Triglycerides 86    HDL Cholesterol 95    LDL Cholesterol  112    Hemoglobin A1C 5.7      Data reviewed : Note from last visit              ASSESSMENT & PLAN     Diagnoses and all orders for this visit:    1. Attention deficit hyperactivity disorder (ADHD), unspecified ADHD type (Primary)    2. Abnormal sensation in right ear      Symptoms controlled on daily Adderall. She may need afternoon dose when she starts working again. Continue the same for now.      As the clinician, I personally reviewed the CARLOS ALBERTO from 08/31/2023 while the patient was in the office today.     History and physical exam exhibit continued safe and appropriate use of controlled substances.    Ear issue improving without intervention. Monitor for now.     There are no preventive care reminders to display for this patient.     Follow Up  Return in about 3 months (around 11/30/2023) for Recheck.    Patient/family had no further questions at this time and verbalized understanding of the plan discussed today.

## 2023-10-16 DIAGNOSIS — F90.9 ATTENTION DEFICIT HYPERACTIVITY DISORDER (ADHD), UNSPECIFIED ADHD TYPE: ICD-10-CM

## 2023-10-17 RX ORDER — DEXTROAMPHETAMINE SACCHARATE, AMPHETAMINE ASPARTATE, DEXTROAMPHETAMINE SULFATE AND AMPHETAMINE SULFATE 1.25; 1.25; 1.25; 1.25 MG/1; MG/1; MG/1; MG/1
5 TABLET ORAL 2 TIMES DAILY
Qty: 60 TABLET | Refills: 0 | Status: SHIPPED | OUTPATIENT
Start: 2023-10-17

## 2023-11-29 DIAGNOSIS — F90.9 ATTENTION DEFICIT HYPERACTIVITY DISORDER (ADHD), UNSPECIFIED ADHD TYPE: ICD-10-CM

## 2023-11-29 RX ORDER — DEXTROAMPHETAMINE SACCHARATE, AMPHETAMINE ASPARTATE, DEXTROAMPHETAMINE SULFATE AND AMPHETAMINE SULFATE 1.25; 1.25; 1.25; 1.25 MG/1; MG/1; MG/1; MG/1
5 TABLET ORAL 2 TIMES DAILY
Qty: 60 TABLET | Refills: 0 | Status: SHIPPED | OUTPATIENT
Start: 2023-11-29 | End: 2023-11-29 | Stop reason: SDUPTHER

## 2023-11-29 RX ORDER — DEXTROAMPHETAMINE SACCHARATE, AMPHETAMINE ASPARTATE, DEXTROAMPHETAMINE SULFATE AND AMPHETAMINE SULFATE 1.25; 1.25; 1.25; 1.25 MG/1; MG/1; MG/1; MG/1
5 TABLET ORAL 2 TIMES DAILY
Qty: 60 TABLET | Refills: 0 | Status: SHIPPED | OUTPATIENT
Start: 2023-11-29 | End: 2023-11-30 | Stop reason: SDUPTHER

## 2023-11-30 ENCOUNTER — OFFICE VISIT (OUTPATIENT)
Dept: INTERNAL MEDICINE | Facility: CLINIC | Age: 38
End: 2023-11-30
Payer: MEDICAID

## 2023-11-30 VITALS
WEIGHT: 163 LBS | DIASTOLIC BLOOD PRESSURE: 81 MMHG | HEIGHT: 69 IN | SYSTOLIC BLOOD PRESSURE: 128 MMHG | OXYGEN SATURATION: 97 % | HEART RATE: 65 BPM | BODY MASS INDEX: 24.14 KG/M2

## 2023-11-30 DIAGNOSIS — Z23 NEED FOR INFLUENZA VACCINATION: ICD-10-CM

## 2023-11-30 DIAGNOSIS — N94.0 MITTELSCHMERZ: ICD-10-CM

## 2023-11-30 DIAGNOSIS — Z23 NEED FOR COVID-19 VACCINE: ICD-10-CM

## 2023-11-30 DIAGNOSIS — F90.9 ATTENTION DEFICIT HYPERACTIVITY DISORDER (ADHD), UNSPECIFIED ADHD TYPE: Primary | ICD-10-CM

## 2023-11-30 RX ORDER — DEXTROAMPHETAMINE SACCHARATE, AMPHETAMINE ASPARTATE, DEXTROAMPHETAMINE SULFATE AND AMPHETAMINE SULFATE 1.25; 1.25; 1.25; 1.25 MG/1; MG/1; MG/1; MG/1
TABLET ORAL
Qty: 90 TABLET | Refills: 0 | Status: SHIPPED | OUTPATIENT
Start: 2023-11-30

## 2023-11-30 NOTE — PROGRESS NOTES
Ildefonso Traore M.D.  Internal Medicine  [facility]  [address], [address]  [place] [address]  [phone]      Chief Complaint  ADHD (3 mth F/U /)    SUBJECTIVE    History of Present Illness    Janett Coronado is a 38 y.o. female who presents to the office today as an established patient that last saw me on 8/31/2023.     ADHD-previously on dextroamphetamine for 2 years. Felt left sided numbness in March starting in her eye which progressively worsened.She was switched to Adderrall. Only takes Adderrall once a day. She is job searching. Coffee in the AM helps to. She is kaylen to concentrate and complete tasks. She is not having re-occurring numbness.  Feels like she had better focus before and was better at staying on task Dexedrine. No issue sleeping.     Wants to do counseling when she gets new insurance    Applying for jobs. Does not always stake second dose.        She saw GYN who performed pelvic US that was normal. She is doing PT. She is doing pelvic floor therapy. This is helping.      Pelvic pain 1 week after period ends after a few  hours. This pain is different than previous RLQ pain.       Review of Systems    No Known Allergies     Outpatient Medications Marked as Taking for the 11/30/23 encounter (Office Visit) with Ildefonso Traore MD   Medication Sig Dispense Refill    amphetamine-dextroamphetamine (Adderall) 5 MG tablet Take 2 tablets by mouth Every Morning AND 1 tablet Every Afternoon. 90 tablet 0    [DISCONTINUED] amphetamine-dextroamphetamine (Adderall) 5 MG tablet Take 1 tablet by mouth 2 (Two) Times a Day. 60 tablet 0        Past Medical History:   Diagnosis Date    Abnormal Pap smear of cervix     ADHD (attention deficit hyperactivity disorder) 2008    Diagnosed in 2021    Difficulty with speech     Heart murmur     Hypertension     during labor    PFO (patent foramen ovale)     Urinary tract infection     > 5 years prior to 2018    Vision changes      Past Surgical  "History:   Procedure Laterality Date     SECTION      COLONOSCOPY      ORIF ULNAR / RADIAL SHAFT FRACTURE      WISDOM TOOTH EXTRACTION       Family History   Problem Relation Age of Onset    Atrial fibrillation Father     Hypertension Father     No Known Problems Mother     Breast cancer Paternal Grandmother     Cancer Paternal Grandmother 70        breast cancer    Alzheimer's disease Maternal Grandmother     Stroke Maternal Grandmother 70    reports that she has never smoked. She has never used smokeless tobacco. She reports current alcohol use of about 5.0 standard drinks of alcohol per week. She reports that she does not use drugs.    OBJECTIVE    Vital Signs:   /81   Pulse 65   Ht 175.3 cm (69.02\")   Wt 73.9 kg (163 lb)   SpO2 97%   BMI 24.06 kg/m²     Physical Exam  Constitutional:       Appearance: Normal appearance. She is normal weight.   Cardiovascular:      Rate and Rhythm: Normal rate and regular rhythm.      Heart sounds: Normal heart sounds. No murmur heard.  Pulmonary:      Effort: Pulmonary effort is normal.      Breath sounds: Normal breath sounds.   Abdominal:      General: Abdomen is flat. There is no distension.      Palpations: Abdomen is soft.      Tenderness: There is abdominal tenderness.      Comments: RLQ tenderness   Musculoskeletal:      Right lower leg: No edema.      Left lower leg: No edema.   Skin:     General: Skin is warm and dry.   Neurological:      Mental Status: She is alert.   Psychiatric:         Mood and Affect: Mood normal.         Behavior: Behavior normal.         Thought Content: Thought content normal.            The following data was reviewed by: Ildefonso Traore MD on 2023:  CMP          2023    14:27   CMP   Glucose 107    BUN 14    Creatinine 0.80    Sodium 139    Potassium 3.9    Chloride 101    Calcium 9.4    Total Protein 7.5    Albumin 4.7    Globulin 2.8    Total Bilirubin 0.3    Alkaline Phosphatase 87    AST (SGOT) 29    ALT (SGPT) 30 "    BUN/Creatinine Ratio 18      CBC w/diff          5/31/2023    14:27   CBC w/Diff   WBC 5.9    RBC 4.67    Hemoglobin 13.9    Hematocrit 42.5    MCV 91    MCH 29.8    MCHC 32.7    RDW 12.2    Platelets 273    Neutrophil Rel % 67    Lymphocyte Rel % 24    Monocyte Rel % 7    Eosinophil Rel % 1    Basophil Rel % 1      Lipid Panel          5/31/2023    14:27   Lipid Panel   Total Cholesterol 222    Triglycerides 86    HDL Cholesterol 95    VLDL Cholesterol 15    LDL Cholesterol  112      TSH          5/31/2023    14:27   TSH   TSH 2.840      A1C Last 3 Results          5/31/2023    14:27   HGBA1C Last 3 Results   Hemoglobin A1C 5.7                  ASSESSMENT & PLAN     Diagnoses and all orders for this visit:    1. Attention deficit hyperactivity disorder (ADHD), unspecified ADHD type (Primary)  -     amphetamine-dextroamphetamine (Adderall) 5 MG tablet; Take 2 tablets by mouth Every Morning AND 1 tablet Every Afternoon.  Dispense: 90 tablet; Refill: 0    2. Need for influenza vaccination  -     Fluzone (or Fluarix & Flulaval for VFC) >6 Mos (8924-9530)    3. Need for COVID-19 vaccine  -     COVID-19 F23 (Pfizer) 12yrs+ (COMIRNATY)    4. Mittelschmerz      Increasing AM dose of Adderrall since this does not work as well as Dexedrine. Augusto reviewed and appropriate.    Discussed Mittelschmerz pain and that this is often managed symptomatically. If she has severe abdominal pain that persists she should go to ED.    There are no preventive care reminders to display for this patient.       Follow Up  Return in about 6 months (around 5/30/2024) for Annual physical.    Patient/family had no further questions at this time and verbalized understanding of the plan discussed today.

## 2024-01-16 DIAGNOSIS — F90.9 ATTENTION DEFICIT HYPERACTIVITY DISORDER (ADHD), UNSPECIFIED ADHD TYPE: ICD-10-CM

## 2024-01-16 RX ORDER — DEXTROAMPHETAMINE SACCHARATE, AMPHETAMINE ASPARTATE, DEXTROAMPHETAMINE SULFATE AND AMPHETAMINE SULFATE 1.25; 1.25; 1.25; 1.25 MG/1; MG/1; MG/1; MG/1
TABLET ORAL
Qty: 90 TABLET | Refills: 0 | Status: SHIPPED | OUTPATIENT
Start: 2024-01-16 | End: 2024-01-16 | Stop reason: SDUPTHER

## 2024-01-16 RX ORDER — DEXTROAMPHETAMINE SACCHARATE, AMPHETAMINE ASPARTATE, DEXTROAMPHETAMINE SULFATE AND AMPHETAMINE SULFATE 1.25; 1.25; 1.25; 1.25 MG/1; MG/1; MG/1; MG/1
TABLET ORAL
Qty: 90 TABLET | Refills: 0 | Status: SHIPPED | OUTPATIENT
Start: 2024-01-16

## 2024-02-23 DIAGNOSIS — F90.9 ATTENTION DEFICIT HYPERACTIVITY DISORDER (ADHD), UNSPECIFIED ADHD TYPE: ICD-10-CM

## 2024-02-23 RX ORDER — DEXTROAMPHETAMINE SACCHARATE, AMPHETAMINE ASPARTATE, DEXTROAMPHETAMINE SULFATE AND AMPHETAMINE SULFATE 1.25; 1.25; 1.25; 1.25 MG/1; MG/1; MG/1; MG/1
TABLET ORAL
Qty: 90 TABLET | Refills: 0 | Status: SHIPPED | OUTPATIENT
Start: 2024-02-23

## 2024-02-23 NOTE — TELEPHONE ENCOUNTER
Caller: Janett Coronado    Relationship: Self    Best call back number:     Requested Prescriptions:   Requested Prescriptions     Pending Prescriptions Disp Refills    amphetamine-dextroamphetamine (Adderall) 5 MG tablet 90 tablet 0     Sig: Take 2 tablets by mouth Every Morning AND 1 tablet Every Afternoon.        Pharmacy where request should be sent: [facility]  [address] [place] [phone]     Last office visit with prescribing clinician: 11/30/2023   Last telemedicine visit with prescribing clinician: Visit date not found   Next office visit with prescribing clinician: 6/14/2024     Additional details provided by patient:     Does the patient have less than a 3 day supply:  [x] Yes  [] No    Would you like a call back once the refill request has been completed: [] Yes [x] No    If the office needs to give you a call back, can they leave a voicemail: [] Yes [] No    Madalyn Aldridge Rep   02/23/24 08:14 EST

## 2024-04-05 DIAGNOSIS — F90.9 ATTENTION DEFICIT HYPERACTIVITY DISORDER (ADHD), UNSPECIFIED ADHD TYPE: ICD-10-CM

## 2024-04-05 RX ORDER — DEXTROAMPHETAMINE SACCHARATE, AMPHETAMINE ASPARTATE, DEXTROAMPHETAMINE SULFATE AND AMPHETAMINE SULFATE 1.25; 1.25; 1.25; 1.25 MG/1; MG/1; MG/1; MG/1
TABLET ORAL
Qty: 90 TABLET | Refills: 0 | Status: SHIPPED | OUTPATIENT
Start: 2024-04-05

## 2024-04-05 NOTE — TELEPHONE ENCOUNTER
Caller: Cliff Janett    Relationship: Self    Best call back number: [phone]     Requested Prescriptions:   Requested Prescriptions     Pending Prescriptions Disp Refills    amphetamine-dextroamphetamine (Adderall) 5 MG tablet 90 tablet 0     Sig: Take 2 tablets by mouth Every Morning AND 1 tablet Every Afternoon.        Pharmacy where request should be sent: [facility] [id number] [place] [address] [facility] [address] - [phone]  - [phone] FX     Last office visit with prescribing clinician: 11/30/2023   Last telemedicine visit with prescribing clinician: Visit date not found   Next office visit with prescribing clinician: 6/14/2024     Additional details provided by patient: PATIENT HAS A 4-5 DAY SUPPLY OF MEDICATION     Does the patient have less than a 3 day supply:  [] Yes  [x] No    Would you like a call back once the refill request has been completed: [] Yes [x] No    If the office needs to give you a call back, can they leave a voicemail: [] Yes [x] No    Madalyn Obregon Rep   04/05/24 09:21 EDT

## 2024-04-29 ENCOUNTER — TELEPHONE (OUTPATIENT)
Dept: OBSTETRICS AND GYNECOLOGY | Facility: CLINIC | Age: 39
End: 2024-04-29

## 2024-04-29 NOTE — TELEPHONE ENCOUNTER
Pt called stating she is going on a cruise in June and was curious as to if we could send her some birth control or something to possibly make her period delay coming, or not come at all? Pt just stated her periods are normally very heavy and she just realized she is supposed to start when they leave so she wouldn't be able to enjoy the water/trip as much.     Pharmacy- [facility] [id number] - [facility] [address] AT [facility] [address] - [phone]  - [phone] FX     Please advise, thank you

## 2024-04-30 ENCOUNTER — TELEPHONE (OUTPATIENT)
Dept: OBSTETRICS AND GYNECOLOGY | Facility: CLINIC | Age: 39
End: 2024-04-30

## 2024-04-30 NOTE — TELEPHONE ENCOUNTER
I returned Janett Coronado phone call, no answer left VM to return my call.    Pt is wanting to start MADHAV to skip menses for upcoming vacation. I need to review risk factors and start up with her.     Madalyn Whitehead, APRN  4/30/24  10:19am

## 2024-05-01 ENCOUNTER — TELEPHONE (OUTPATIENT)
Dept: OBSTETRICS AND GYNECOLOGY | Facility: CLINIC | Age: 39
End: 2024-05-01

## 2024-05-01 RX ORDER — NORGESTIMATE AND ETHINYL ESTRADIOL 0.25-0.035
1 KIT ORAL DAILY
Qty: 84 TABLET | Refills: 3 | Status: SHIPPED | OUTPATIENT
Start: 2024-05-01

## 2024-05-01 NOTE — TELEPHONE ENCOUNTER
I called Janett Coronado to discuss her request for MADHAV. Denies history of migraine with aura, denies history of DVT, there is no family history of DVT. She is a nonsmoker.  Discussed start up. Partner has vasectomy. She is using to suppress menses during an upcoming trip. Discussed risks and side effects.     Madalyn Whitehead, APRN  5/1/24  8:38am

## 2024-05-22 DIAGNOSIS — R73.9 HYPERGLYCEMIA: ICD-10-CM

## 2024-05-22 DIAGNOSIS — Z00.00 ROUTINE ADULT HEALTH MAINTENANCE: Primary | ICD-10-CM

## 2024-05-23 DIAGNOSIS — F90.9 ATTENTION DEFICIT HYPERACTIVITY DISORDER (ADHD), UNSPECIFIED ADHD TYPE: ICD-10-CM

## 2024-05-23 LAB
ALBUMIN SERPL-MCNC: 4.4 G/DL (ref 3.5–5.2)
ALBUMIN/GLOB SERPL: 2 G/DL
ALP SERPL-CCNC: 48 U/L (ref 39–117)
ALT SERPL-CCNC: 10 U/L (ref 1–33)
APPEARANCE UR: CLEAR
AST SERPL-CCNC: 16 U/L (ref 1–32)
BASOPHILS # BLD AUTO: 0.02 10*3/MM3 (ref 0–0.2)
BASOPHILS NFR BLD AUTO: 0.5 % (ref 0–1.5)
BILIRUB SERPL-MCNC: 0.3 MG/DL (ref 0–1.2)
BILIRUB UR QL STRIP: NEGATIVE
BUN SERPL-MCNC: 8 MG/DL (ref 6–20)
BUN/CREAT SERPL: 10 (ref 7–25)
CALCIUM SERPL-MCNC: 9.1 MG/DL (ref 8.6–10.5)
CHLORIDE SERPL-SCNC: 102 MMOL/L (ref 98–107)
CHOLEST SERPL-MCNC: 174 MG/DL (ref 0–200)
CHOLEST/HDLC SERPL: 2.18 {RATIO}
CO2 SERPL-SCNC: 24.9 MMOL/L (ref 22–29)
COLOR UR: YELLOW
CREAT SERPL-MCNC: 0.8 MG/DL (ref 0.57–1)
EGFRCR SERPLBLD CKD-EPI 2021: 96.9 ML/MIN/1.73
EOSINOPHIL # BLD AUTO: 0.03 10*3/MM3 (ref 0–0.4)
EOSINOPHIL NFR BLD AUTO: 0.8 % (ref 0.3–6.2)
ERYTHROCYTE [DISTWIDTH] IN BLOOD BY AUTOMATED COUNT: 11.6 % (ref 12.3–15.4)
GLOBULIN SER CALC-MCNC: 2.2 GM/DL
GLUCOSE SERPL-MCNC: 98 MG/DL (ref 65–99)
GLUCOSE UR QL STRIP: NEGATIVE
HBA1C MFR BLD: 5.5 % (ref 4.8–5.6)
HCT VFR BLD AUTO: 37 % (ref 34–46.6)
HDLC SERPL-MCNC: 80 MG/DL (ref 40–60)
HGB BLD-MCNC: 12.2 G/DL (ref 12–15.9)
HGB UR QL STRIP: NEGATIVE
IMM GRANULOCYTES # BLD AUTO: 0.01 10*3/MM3 (ref 0–0.05)
IMM GRANULOCYTES NFR BLD AUTO: 0.3 % (ref 0–0.5)
KETONES UR QL STRIP: NEGATIVE
LDLC SERPL CALC-MCNC: 81 MG/DL (ref 0–100)
LEUKOCYTE ESTERASE UR QL STRIP: NEGATIVE
LYMPHOCYTES # BLD AUTO: 1.15 10*3/MM3 (ref 0.7–3.1)
LYMPHOCYTES NFR BLD AUTO: 31.4 % (ref 19.6–45.3)
MCH RBC QN AUTO: 30.7 PG (ref 26.6–33)
MCHC RBC AUTO-ENTMCNC: 33 G/DL (ref 31.5–35.7)
MCV RBC AUTO: 93.2 FL (ref 79–97)
MONOCYTES # BLD AUTO: 0.34 10*3/MM3 (ref 0.1–0.9)
MONOCYTES NFR BLD AUTO: 9.3 % (ref 5–12)
NEUTROPHILS # BLD AUTO: 2.11 10*3/MM3 (ref 1.7–7)
NEUTROPHILS NFR BLD AUTO: 57.7 % (ref 42.7–76)
NITRITE UR QL STRIP: NEGATIVE
NRBC BLD AUTO-RTO: 0 /100 WBC (ref 0–0.2)
PH UR STRIP: 8 [PH] (ref 5–8)
PLATELET # BLD AUTO: 209 10*3/MM3 (ref 140–450)
POTASSIUM SERPL-SCNC: 4.5 MMOL/L (ref 3.5–5.2)
PROT SERPL-MCNC: 6.6 G/DL (ref 6–8.5)
PROT UR QL STRIP: NEGATIVE
RBC # BLD AUTO: 3.97 10*6/MM3 (ref 3.77–5.28)
SODIUM SERPL-SCNC: 136 MMOL/L (ref 136–145)
SP GR UR STRIP: 1.01 (ref 1–1.03)
TRIGL SERPL-MCNC: 68 MG/DL (ref 0–150)
TSH SERPL DL<=0.005 MIU/L-ACNC: 2.34 UIU/ML (ref 0.27–4.2)
UROBILINOGEN UR STRIP-MCNC: NORMAL MG/DL
VLDLC SERPL CALC-MCNC: 13 MG/DL (ref 5–40)
WBC # BLD AUTO: 3.66 10*3/MM3 (ref 3.4–10.8)

## 2024-05-23 RX ORDER — DEXTROAMPHETAMINE SACCHARATE, AMPHETAMINE ASPARTATE, DEXTROAMPHETAMINE SULFATE AND AMPHETAMINE SULFATE 1.25; 1.25; 1.25; 1.25 MG/1; MG/1; MG/1; MG/1
TABLET ORAL
Qty: 90 TABLET | Refills: 0 | Status: SHIPPED | OUTPATIENT
Start: 2024-05-23

## 2024-05-23 NOTE — TELEPHONE ENCOUNTER
Caller: Cliff Janett    Relationship: Self    Best call back number: [phone]     Requested Prescriptions:   Requested Prescriptions     Pending Prescriptions Disp Refills    amphetamine-dextroamphetamine (Adderall) 5 MG tablet 90 tablet 0     Sig: Take 2 tablets by mouth Every Morning AND 1 tablet Every Afternoon.        Pharmacy where request should be sent: [facility] [id number] [place] [address] [facility] [address] - [phone]  - [phone] FX     Last office visit with prescribing clinician: 11/30/2023   Last telemedicine visit with prescribing clinician: Visit date not found   Next office visit with prescribing clinician: 6/14/2024         Does the patient have less than a 3 day supply:  [] Yes  [x] No    Would you like a call back once the refill request has been completed: [] Yes [] No    If the office needs to give you a call back, can they leave a voicemail: [] Yes [] No    Madalyn Tyler Rep   05/23/24 15:21 EDT

## 2024-07-12 DIAGNOSIS — F90.9 ATTENTION DEFICIT HYPERACTIVITY DISORDER (ADHD), UNSPECIFIED ADHD TYPE: ICD-10-CM

## 2024-07-12 RX ORDER — DEXTROAMPHETAMINE SACCHARATE, AMPHETAMINE ASPARTATE, DEXTROAMPHETAMINE SULFATE AND AMPHETAMINE SULFATE 1.25; 1.25; 1.25; 1.25 MG/1; MG/1; MG/1; MG/1
TABLET ORAL
Qty: 90 TABLET | Refills: 0 | Status: SHIPPED | OUTPATIENT
Start: 2024-07-12

## 2024-07-12 NOTE — TELEPHONE ENCOUNTER
Caller: Janett Coronado    Relationship: Self    Best call back number: [phone]     Requested Prescriptions:   Requested Prescriptions     Pending Prescriptions Disp Refills    amphetamine-dextroamphetamine (Adderall) 5 MG tablet 90 tablet 0     Sig: Take 2 tablets by mouth Every Morning AND 1 tablet Every Afternoon.        Pharmacy where request should be sent: [facility] [id number] [place] [address] AT [facility] [address] - [phone]  - [phone] FX     Last office visit with prescribing clinician: 11/30/2023   Last telemedicine visit with prescribing clinician: Visit date not found   Next office visit with prescribing clinician: 8/16/2024     Additional details provided by patient:     Does the patient have less than a 3 day supply:  [] Yes  [x] No    Would you like a call back once the refill request has been completed: [] Yes [x] No    If the office needs to give you a call back, can they leave a voicemail: [] Yes [x] No    Madalyn Obregon Rep   07/12/24 11:18 EDT

## 2024-08-16 ENCOUNTER — OFFICE VISIT (OUTPATIENT)
Dept: INTERNAL MEDICINE | Facility: CLINIC | Age: 39
End: 2024-08-16
Payer: COMMERCIAL

## 2024-08-16 VITALS
WEIGHT: 163.4 LBS | OXYGEN SATURATION: 99 % | SYSTOLIC BLOOD PRESSURE: 122 MMHG | BODY MASS INDEX: 24.2 KG/M2 | HEART RATE: 62 BPM | HEIGHT: 69 IN | DIASTOLIC BLOOD PRESSURE: 80 MMHG

## 2024-08-16 DIAGNOSIS — Z00.00 ANNUAL PHYSICAL EXAM: Primary | ICD-10-CM

## 2024-08-16 DIAGNOSIS — F90.0 ATTENTION DEFICIT HYPERACTIVITY DISORDER (ADHD), PREDOMINANTLY INATTENTIVE TYPE: ICD-10-CM

## 2024-08-16 DIAGNOSIS — Z79.899 HIGH RISK MEDICATION USE: ICD-10-CM

## 2024-08-16 PROCEDURE — 99395 PREV VISIT EST AGE 18-39: CPT | Performed by: STUDENT IN AN ORGANIZED HEALTH CARE EDUCATION/TRAINING PROGRAM

## 2024-08-16 NOTE — PROGRESS NOTES
Ildefonso Traore M.D.  Internal Medicine  [facility]  [address], [address]  [place] [address]  [phone]      Chief Complaint  Annual Exam    SUBJECTIVE    History of Present Illness    Janett Coronado is a 38 y.o. female with ADHD who presents to the office today as an established patient that last saw me on 2023.     She has history of cardiac arrest during anesthesia of her uterus during a .  She was evaluated by cardiology. Has rare palpitations.     Now back on birth control for pelvic pain. Skipping placebo week to have period every 3 months.     ADD- concerned headaches could be from Adderall. Uses Ibuprofen. Takes 1.5 tabs in the AM and 1 in afternoon. Feels cloudy and daydreams if she is off Adderall,     Trying to exercise more.     Review of Systems    No Known Allergies     Outpatient Medications Marked as Taking for the 24 encounter (Office Visit) with Ildefonso Traore MD   Medication Sig Dispense Refill    amphetamine-dextroamphetamine (Adderall) 5 MG tablet Take 2 tablets by mouth Every Morning AND 1 tablet Every Afternoon. 90 tablet 0    norgestimate-ethinyl estradiol (Sprintec 28) 0.25-35 MG-MCG per tablet Take 1 tablet by mouth Daily. 84 tablet 3        Past Medical History:   Diagnosis Date    Abnormal Pap smear of cervix     ADHD (attention deficit hyperactivity disorder)     Diagnosed in     Difficulty with speech     Heart murmur     Hypertension     during labor    PFO (patent foramen ovale)     Urinary tract infection     > 5 years prior to 2018    Vision changes      Past Surgical History:   Procedure Laterality Date     SECTION      COLONOSCOPY      ORIF ULNAR / RADIAL SHAFT FRACTURE      WISDOM TOOTH EXTRACTION       Family History   Problem Relation Age of Onset    Atrial fibrillation Father     Hypertension Father     No Known Problems Mother     Breast cancer Paternal Grandmother     Cancer Paternal Grandmother 70        breast  "cancer    Alzheimer's disease Maternal Grandmother     Stroke Maternal Grandmother 70    reports that she has never smoked. She has never been exposed to tobacco smoke. She has never used smokeless tobacco. She reports current alcohol use of about 5.0 standard drinks of alcohol per week. She reports that she does not use drugs.    OBJECTIVE    Vital Signs:   /80   Pulse 62   Ht 175.3 cm (69.02\")   Wt 74.1 kg (163 lb 6.4 oz)   SpO2 99%   BMI 24.12 kg/m²     Physical Exam  Constitutional:       Appearance: Normal appearance. She is normal weight.   Cardiovascular:      Rate and Rhythm: Normal rate and regular rhythm.      Heart sounds: Normal heart sounds. No murmur heard.  Pulmonary:      Effort: Pulmonary effort is normal.      Breath sounds: Normal breath sounds.   Abdominal:      General: Abdomen is flat. There is no distension.      Palpations: Abdomen is soft.      Tenderness: There is no abdominal tenderness.   Musculoskeletal:      Right lower leg: No edema.      Left lower leg: No edema.   Skin:     General: Skin is warm and dry.   Neurological:      Mental Status: She is alert.   Psychiatric:         Mood and Affect: Mood normal.         Behavior: Behavior normal.         Thought Content: Thought content normal.            The following data was reviewed by: Ildefonso Traore MD on 08/16/2024:  CMP          5/22/2024    08:46   CMP   Glucose 98    BUN 8    Creatinine 0.80    Sodium 136    Potassium 4.5    Chloride 102    Calcium 9.1    Total Protein 6.6    Albumin 4.4    Globulin 2.2    Total Bilirubin 0.3    Alkaline Phosphatase 48    AST (SGOT) 16    ALT (SGPT) 10    BUN/Creatinine Ratio 10.0      CBC w/diff          5/22/2024    08:46   CBC w/Diff   WBC 3.66    RBC 3.97    Hemoglobin 12.2    Hematocrit 37.0    MCV 93.2    MCH 30.7    MCHC 33.0    RDW 11.6    Platelets 209    Neutrophil Rel % 57.7    Lymphocyte Rel % 31.4    Monocyte Rel % 9.3    Eosinophil Rel % 0.8    Basophil Rel % 0.5      Lipid " Panel          2024    08:46   Lipid Panel   Total Cholesterol 174    Triglycerides 68    HDL Cholesterol 80    VLDL Cholesterol 13    LDL Cholesterol  81      TSH          2024    08:46   TSH   TSH 2.340      A1C Last 3 Results          2024    08:46   HGBA1C Last 3 Results   Hemoglobin A1C 5.50      Data reviewed : recent GYN note              ASSESSMENT & PLAN     Diagnoses and all orders for this visit:    1. Annual physical exam (Primary)    2. Attention deficit hyperactivity disorder (ADHD), predominantly inattentive type  -     Urine Drug Screen - Urine, Clean Catch    3. High risk medication use  -     Urine Drug Screen - Urine, Clean Catch      ADD on Adderall. She has some headaches that she thinks could be related to Adderall but she wants to monitor for now. She had cardiac arrest during anesthesia for prior . Previous Cardiac workup includes echo in  with small PFO and normal Holter in .     #Annual Preventative Health Examination   -Age and sex appropriate physical exam performed and documented. Updated past medical, family, social and surgical histories as well as allergies and care team list. Addressed care gaps listed in the medical record.  -Encouraged annual dental and vision exams as part of their overall health.  -Encouraged minimum of 30 minutes or more of exercise at a brisk walk or higher 5 days per week combined with a well-balanced diet.  -Immunizations reviewed and updated in EMR. Recommend Flu shot in Fall.  -Advised that all women who are planning or capable of pregnancy take a daily supplement containing 0.4 to 0.8 mg (400 to 800 ?g) of folic acid.  The ASCVD Risk score (Lisseth BEDOYA, et al., 2019) failed to calculate for the following reasons:    The 2019 ASCVD risk score is only valid for ages 40 to 79   -Lipid screening: recent normal lipid panel  -Aspirin for primary or secondary prevention: Not applicable, patient is less than age 50.  -Depression  screening: PHQ2 performed and the patient's screen was negative.  -Diabetes screening:    Screening not indicated at this time.   -Hypertension screening: Patient screened negative for HTN today.  -Hepatitis C virus screening:  Discussed with patient that the [place]PSTF recommends a one-time screening of hepatitis C in all adults 18-79 years old. Patient accepted screening.  -Colon cancer screening: Patient is less than age 45 and colon cancer screening is not indicated.  -Lung cancer screening: Patient has never smoked.  -Cervical cancer screening:  Follows with GYN    Health Maintenance Due   Topic Date Due    ANNUAL PHYSICAL  02/27/2024    INFLUENZA VACCINE  08/01/2024        Follow Up  Return in about 6 months (around 2/16/2025) for Recheck.    Patient/family had no further questions at this time and verbalized understanding of the plan discussed today.

## 2024-08-18 LAB
AMPHETAMINES UR QL SCN: NEGATIVE NG/ML
BARBITURATES UR QL SCN: NEGATIVE NG/ML
BENZODIAZ UR QL SCN: NEGATIVE NG/ML
BZE UR QL SCN: NEGATIVE NG/ML
CANNABINOIDS UR QL SCN: NEGATIVE NG/ML
CREAT UR-MCNC: 11.7 MG/DL (ref 20–300)
LABORATORY COMMENT REPORT: ABNORMAL
METHADONE UR QL SCN: NEGATIVE NG/ML
OPIATES UR QL SCN: NEGATIVE NG/ML
OXYCODONE+OXYMORPHONE UR QL SCN: NEGATIVE NG/ML
PCP UR QL: NEGATIVE NG/ML
PH UR: 6.9 [PH] (ref 4.5–8.9)
PROPOXYPH UR QL SCN: NEGATIVE NG/ML
SP GR UR: 1

## 2024-08-26 DIAGNOSIS — F90.9 ATTENTION DEFICIT HYPERACTIVITY DISORDER (ADHD), UNSPECIFIED ADHD TYPE: ICD-10-CM

## 2024-08-26 RX ORDER — DEXTROAMPHETAMINE SACCHARATE, AMPHETAMINE ASPARTATE, DEXTROAMPHETAMINE SULFATE AND AMPHETAMINE SULFATE 1.25; 1.25; 1.25; 1.25 MG/1; MG/1; MG/1; MG/1
TABLET ORAL
Qty: 90 TABLET | Refills: 0 | Status: SHIPPED | OUTPATIENT
Start: 2024-08-26

## 2024-08-26 NOTE — TELEPHONE ENCOUNTER
Caller: Coronado Janett    Relationship: Self    Best call back number: [phone]     Requested Prescriptions:   Requested Prescriptions     Pending Prescriptions Disp Refills    amphetamine-dextroamphetamine (Adderall) 5 MG tablet 90 tablet 0     Sig: Take 2 tablets by mouth Every Morning AND 1 tablet Every Afternoon.        Pharmacy where request should be sent: [facility] [id number] [place] [address] [address] [facility] [address] - [phone]  - [phone] FX     Last office visit with prescribing clinician: 8/16/2024   Last telemedicine visit with prescribing clinician: Visit date not found   Next office visit with prescribing clinician: 2/19/2025     Does the patient have less than a 3 day supply:  [] Yes  [x] No    Would you like a call back once the refill request has been completed: [] Yes [x] No    If the office needs to give you a call back, can they leave a voicemail: [] Yes [x] No    Madalyn Dickinson Rep   08/26/24 09:10 EDT

## 2024-10-04 ENCOUNTER — OFFICE VISIT (OUTPATIENT)
Dept: INTERNAL MEDICINE | Facility: CLINIC | Age: 39
End: 2024-10-04
Payer: COMMERCIAL

## 2024-10-04 VITALS
HEART RATE: 58 BPM | BODY MASS INDEX: 24.2 KG/M2 | DIASTOLIC BLOOD PRESSURE: 78 MMHG | OXYGEN SATURATION: 99 % | HEIGHT: 69 IN | SYSTOLIC BLOOD PRESSURE: 120 MMHG | WEIGHT: 163.4 LBS

## 2024-10-04 DIAGNOSIS — R10.9 ABDOMINAL PAIN, UNSPECIFIED ABDOMINAL LOCATION: Primary | ICD-10-CM

## 2024-10-04 PROCEDURE — 99213 OFFICE O/P EST LOW 20 MIN: CPT | Performed by: STUDENT IN AN ORGANIZED HEALTH CARE EDUCATION/TRAINING PROGRAM

## 2024-10-04 NOTE — PROGRESS NOTES
"  Ildefonso Traore M.D.  Internal Medicine  [facility]  [address], [address]  [place] [address]  [phone]      Chief Complaint  Flank Pain (Right Side pain on going. Knot is still present in right side as well //)    SUBJECTIVE    History of Present Illness    Janett Coronado is a 39 y.o. female with history of ADD who presents to the office today as an established patient that last saw me on 8/16/2024.     Concerned about side pain and bulging area. Last week bothering her 3 days in a row. After last appointment had 3 weeks of constant pain. Wonders if gasto related. Sometimmes better if she presses on it. Has constipation. Took miralax. Passing gas does not improve it. Having BM does improve pain. Nontender. Knot present for 1 year. More laying down at night. Notices more. Feels a knot. No change in bm. Drinks a lot of water. No blood in stool. Colonoscopy in 2013 of Side pain in 2012. States colonoscopy normal. Told she had \"twisty\" colon. Worse over the years.      She had right sided pain for years thaat does not hinder day to day activity. Pain was right lower sided  That comes and goes. Now pain is to the right of umbilicus. Hurts if she moves a certain way. Worse prior to passing gas. No blood in stool or urine. No weight loss.  Pain not related to her periods or bowel movements.     Pain 2-4/10. Lasts for hours-days. Can function through it.     No urinaray symptoms. No vaginal disarge/pain. More pain with deep penetration.     Pain non positional. Prescribed muscle relaxer but did not help.     She saw GYN who performed pelvic US that was normal. She is doing PT. She is doing pelvic floor therapy. elvic pain 1 week after period ends after a few  hours. Periods have been heavier. Pain not related to periods. Pees her pants running.     Feels under rib and back. Not effected by eating. Might be worse with constipations.     Metamucil in the PM.     In 2022 she had CT abdomen pelvis " "which was unremarkable.  She also had gallbladder ultrasound which was also normal.  She had negative HIDA scan.        Review of Systems    No Known Allergies     Outpatient Medications Marked as Taking for the 10/4/24 encounter (Office Visit) with Ildefonso Traore MD   Medication Sig Dispense Refill    amphetamine-dextroamphetamine (Adderall) 5 MG tablet Take 2 tablets by mouth Every Morning AND 1 tablet Every Afternoon. 90 tablet 0    norgestimate-ethinyl estradiol (Sprintec 28) 0.25-35 MG-MCG per tablet Take 1 tablet by mouth Daily. 84 tablet 3        Past Medical History:   Diagnosis Date    Abnormal Pap smear of cervix     ADHD (attention deficit hyperactivity disorder)     Diagnosed in     Difficulty with speech     Heart murmur     Hypertension     during labor    PFO (patent foramen ovale)     Urinary tract infection     > 5 years prior to 2018    Vision changes      Past Surgical History:   Procedure Laterality Date     SECTION      COLONOSCOPY      ORIF ULNAR / RADIAL SHAFT FRACTURE      WISDOM TOOTH EXTRACTION       Family History   Problem Relation Age of Onset    Atrial fibrillation Father     Hypertension Father     No Known Problems Mother     Breast cancer Paternal Grandmother     Cancer Paternal Grandmother 70        breast cancer    Alzheimer's disease Maternal Grandmother     Stroke Maternal Grandmother 70    reports that she has never smoked. She has never been exposed to tobacco smoke. She has never used smokeless tobacco. She reports current alcohol use of about 5.0 standard drinks of alcohol per week. She reports that she does not use drugs.    OBJECTIVE    Vital Signs:   /78   Pulse 58   Ht 175.3 cm (69.02\")   Wt 74.1 kg (163 lb 6.4 oz)   SpO2 99%   BMI 24.12 kg/m²     Physical Exam  Constitutional:       Appearance: Normal appearance. She is normal weight.   Cardiovascular:      Rate and Rhythm: Normal rate and regular rhythm.      Heart sounds: Normal heart sounds. " "No murmur heard.  Pulmonary:      Effort: Pulmonary effort is normal.      Breath sounds: Normal breath sounds.   Abdominal:      General: Abdomen is flat. There is no distension.      Palpations: Abdomen is soft.      Tenderness: There is no abdominal tenderness.      Comments: Low transverse  scar.  \"Knot\" of concern is under scar.  Mobile, nontender   Skin:     General: Skin is warm and dry.   Neurological:      Mental Status: She is alert.   Psychiatric:         Mood and Affect: Mood normal.         Behavior: Behavior normal.         Thought Content: Thought content normal.              The following data was reviewed by: Ildefonso Traore MD on 10/04/2024:  CMP          2024    08:46 10/4/2024    08:52   CMP   Glucose 98  93    BUN 8  8    Creatinine 0.80  0.77    Sodium 136  139    Potassium 4.5  4.8    Chloride 102  105    Calcium 9.1  9.4    Total Protein 6.6  6.7    Albumin 4.4  4.3    Globulin 2.2  2.4    Total Bilirubin 0.3  0.3    Alkaline Phosphatase 48  51    AST (SGOT) 16  16    ALT (SGPT) 10  8    BUN/Creatinine Ratio 10.0  10.4      CBC w/diff          2024    08:46 10/4/2024    08:52   CBC w/Diff   WBC 3.66  2.92    RBC 3.97  4.47    Hemoglobin 12.2  13.3    Hematocrit 37.0  39.9    MCV 93.2  89.3    MCH 30.7  29.8    MCHC 33.0  33.3    RDW 11.6  11.5    Platelets 209  214    Neutrophil Rel % 57.7  41.4    Lymphocyte Rel % 31.4  47.3    Monocyte Rel % 9.3  9.2    Eosinophil Rel % 0.8  1.4    Basophil Rel % 0.5  0.7      Lipid Panel          2024    08:46   Lipid Panel   Total Cholesterol 174    Triglycerides 68    HDL Cholesterol 80    VLDL Cholesterol 13    LDL Cholesterol  81      TSH          2024    08:46   TSH   TSH 2.340      A1C Last 3 Results          2024    08:46   HGBA1C Last 3 Results   Hemoglobin A1C 5.50      Data reviewed : Imaging studies    CT Abdomen Pelvis With Contrast (2022 13:30)   NM HIDA SCAN WITH PHARMACOLOGICAL INTERVENTION (2022 " 09:26)   US Gallbladder (2022 07:22)   CT Abdomen Pelvis With Contrast (2022 13:30)           ASSESSMENT & PLAN     Diagnoses and all orders for this visit:    1. Abdominal pain, unspecified abdominal location (Primary)  -     Comprehensive Metabolic Panel  -     CBC & Differential  -     Amylase  -     Lipase  -     Urinalysis With Culture If Indicated -      Here for chronic, mild, intermittent lower abdominal for years. Previous workup reassuring. Area of concern I think is scar tissue related to  scar. Blood work is normal. Recommend trial of miralax and docusate senna. Will try More aggressive bowel regimen and see if this helps.           Health Maintenance Due   Topic Date Due    INFLUENZA VACCINE  2024        Follow Up  No follow-ups on file.    Patient/family had no further questions at this time and verbalized understanding of the plan discussed today.

## 2024-10-05 LAB
ALBUMIN SERPL-MCNC: 4.3 G/DL (ref 3.5–5.2)
ALBUMIN/GLOB SERPL: 1.8 G/DL
ALP SERPL-CCNC: 51 U/L (ref 39–117)
ALT SERPL-CCNC: 8 U/L (ref 1–33)
AMYLASE SERPL-CCNC: 58 U/L (ref 28–100)
APPEARANCE UR: CLEAR
AST SERPL-CCNC: 16 U/L (ref 1–32)
BACTERIA #/AREA URNS HPF: NORMAL /[HPF]
BASOPHILS # BLD AUTO: 0.02 10*3/MM3 (ref 0–0.2)
BASOPHILS NFR BLD AUTO: 0.7 % (ref 0–1.5)
BILIRUB SERPL-MCNC: 0.3 MG/DL (ref 0–1.2)
BILIRUB UR QL STRIP: NEGATIVE
BUN SERPL-MCNC: 8 MG/DL (ref 6–20)
BUN/CREAT SERPL: 10.4 (ref 7–25)
CALCIUM SERPL-MCNC: 9.4 MG/DL (ref 8.6–10.5)
CASTS URNS QL MICRO: NORMAL /LPF
CHLORIDE SERPL-SCNC: 105 MMOL/L (ref 98–107)
CO2 SERPL-SCNC: 24.9 MMOL/L (ref 22–29)
COLOR UR: YELLOW
CREAT SERPL-MCNC: 0.77 MG/DL (ref 0.57–1)
EGFRCR SERPLBLD CKD-EPI 2021: 100.8 ML/MIN/1.73
EOSINOPHIL # BLD AUTO: 0.04 10*3/MM3 (ref 0–0.4)
EOSINOPHIL NFR BLD AUTO: 1.4 % (ref 0.3–6.2)
EPI CELLS #/AREA URNS HPF: NORMAL /HPF (ref 0–10)
ERYTHROCYTE [DISTWIDTH] IN BLOOD BY AUTOMATED COUNT: 11.5 % (ref 12.3–15.4)
GLOBULIN SER CALC-MCNC: 2.4 GM/DL
GLUCOSE SERPL-MCNC: 93 MG/DL (ref 65–99)
GLUCOSE UR QL STRIP: NEGATIVE
HCT VFR BLD AUTO: 39.9 % (ref 34–46.6)
HGB BLD-MCNC: 13.3 G/DL (ref 12–15.9)
HGB UR QL STRIP: NEGATIVE
IMM GRANULOCYTES # BLD AUTO: 0 10*3/MM3 (ref 0–0.05)
IMM GRANULOCYTES NFR BLD AUTO: 0 % (ref 0–0.5)
KETONES UR QL STRIP: NEGATIVE
LEUKOCYTE ESTERASE UR QL STRIP: NEGATIVE
LIPASE SERPL-CCNC: 46 U/L (ref 13–60)
LYMPHOCYTES # BLD AUTO: 1.38 10*3/MM3 (ref 0.7–3.1)
LYMPHOCYTES NFR BLD AUTO: 47.3 % (ref 19.6–45.3)
MCH RBC QN AUTO: 29.8 PG (ref 26.6–33)
MCHC RBC AUTO-ENTMCNC: 33.3 G/DL (ref 31.5–35.7)
MCV RBC AUTO: 89.3 FL (ref 79–97)
MICRO URNS: NORMAL
MICRO URNS: NORMAL
MONOCYTES # BLD AUTO: 0.27 10*3/MM3 (ref 0.1–0.9)
MONOCYTES NFR BLD AUTO: 9.2 % (ref 5–12)
NEUTROPHILS # BLD AUTO: 1.21 10*3/MM3 (ref 1.7–7)
NEUTROPHILS NFR BLD AUTO: 41.4 % (ref 42.7–76)
NITRITE UR QL STRIP: NEGATIVE
NRBC BLD AUTO-RTO: 0 /100 WBC (ref 0–0.2)
PH UR STRIP: 7 [PH] (ref 5–7.5)
PLATELET # BLD AUTO: 214 10*3/MM3 (ref 140–450)
POTASSIUM SERPL-SCNC: 4.8 MMOL/L (ref 3.5–5.2)
PROT SERPL-MCNC: 6.7 G/DL (ref 6–8.5)
PROT UR QL STRIP: NEGATIVE
RBC # BLD AUTO: 4.47 10*6/MM3 (ref 3.77–5.28)
RBC #/AREA URNS HPF: NORMAL /HPF (ref 0–2)
SODIUM SERPL-SCNC: 139 MMOL/L (ref 136–145)
SP GR UR STRIP: 1 (ref 1–1.03)
URINALYSIS REFLEX: NORMAL
UROBILINOGEN UR STRIP-MCNC: 0.2 MG/DL (ref 0.2–1)
WBC # BLD AUTO: 2.92 10*3/MM3 (ref 3.4–10.8)
WBC #/AREA URNS HPF: NORMAL /HPF (ref 0–5)

## 2024-10-11 DIAGNOSIS — F90.9 ATTENTION DEFICIT HYPERACTIVITY DISORDER (ADHD), UNSPECIFIED ADHD TYPE: ICD-10-CM

## 2024-10-11 RX ORDER — DEXTROAMPHETAMINE SACCHARATE, AMPHETAMINE ASPARTATE, DEXTROAMPHETAMINE SULFATE AND AMPHETAMINE SULFATE 1.25; 1.25; 1.25; 1.25 MG/1; MG/1; MG/1; MG/1
TABLET ORAL
Qty: 90 TABLET | Refills: 0 | Status: SHIPPED | OUTPATIENT
Start: 2024-10-11

## 2024-10-11 NOTE — TELEPHONE ENCOUNTER
Caller: Cliff Janett    Relationship: Self    Best call back number: [phone]     Requested Prescriptions:   Requested Prescriptions     Pending Prescriptions Disp Refills    amphetamine-dextroamphetamine (Adderall) 5 MG tablet 90 tablet 0     Sig: Take 2 tablets by mouth Every Morning AND 1 tablet Every Afternoon.        Pharmacy where request should be sent: [facility] [id number] [place] [id number] [address] [facility] [address] - [phone]  - [phone] FX     Last office visit with prescribing clinician: 10/4/2024   Last telemedicine visit with prescribing clinician: Visit date not found   Next office visit with prescribing clinician: 2/19/2025       Does the patient have less than a 3 day supply:  [x] Yes  [] No    Would you like a call back once the refill request has been completed: [] Yes [x] No    If the office needs to give you a call back, can they leave a voicemail: [x] Yes [] No    Madalyn Ansari Rep   10/11/24 13:20 EDT

## 2024-11-12 ENCOUNTER — TELEPHONE (OUTPATIENT)
Dept: OBSTETRICS AND GYNECOLOGY | Facility: CLINIC | Age: 39
End: 2024-11-12
Payer: COMMERCIAL

## 2024-11-19 DIAGNOSIS — F90.9 ATTENTION DEFICIT HYPERACTIVITY DISORDER (ADHD), UNSPECIFIED ADHD TYPE: ICD-10-CM

## 2024-11-19 RX ORDER — DEXTROAMPHETAMINE SACCHARATE, AMPHETAMINE ASPARTATE, DEXTROAMPHETAMINE SULFATE AND AMPHETAMINE SULFATE 1.25; 1.25; 1.25; 1.25 MG/1; MG/1; MG/1; MG/1
TABLET ORAL
Qty: 90 TABLET | Refills: 0 | Status: SHIPPED | OUTPATIENT
Start: 2024-11-19

## 2024-11-19 NOTE — TELEPHONE ENCOUNTER
Caller: CoronadoJanett    Relationship: Self    Best call back number: [phone]    Requested Prescriptions:   Requested Prescriptions     Pending Prescriptions Disp Refills    amphetamine-dextroamphetamine (Adderall) 5 MG tablet 90 tablet 0     Sig: Take 2 tablets by mouth Every Morning AND 1 tablet Every Afternoon.        Pharmacy where request should be sent: [facility] [id number] [place] [address] [address] [facility] [address] - [phone]  - [phone] FX     Last office visit with prescribing clinician: 10/4/2024   Last telemedicine visit with prescribing clinician: Visit date not found   Next office visit with prescribing clinician: 2/19/2025     Additional details provided by patient: STATED THAT THEY HAVE ABOUT A DAY AND A HALF REMAINING ON THE MEDICATION    Does the patient have less than a 3 day supply:  [x] Yes  [] No    Would you like a call back once the refill request has been completed: [] Yes [x] No    If the office needs to give you a call back, can they leave a voicemail: [] Yes [x] No    Madalyn Mckenzie Rep   11/19/24 08:04 EST

## 2024-12-27 DIAGNOSIS — F90.9 ATTENTION DEFICIT HYPERACTIVITY DISORDER (ADHD), UNSPECIFIED ADHD TYPE: ICD-10-CM

## 2024-12-27 RX ORDER — DEXTROAMPHETAMINE SACCHARATE, AMPHETAMINE ASPARTATE, DEXTROAMPHETAMINE SULFATE AND AMPHETAMINE SULFATE 1.25; 1.25; 1.25; 1.25 MG/1; MG/1; MG/1; MG/1
TABLET ORAL
Qty: 90 TABLET | Refills: 0 | Status: SHIPPED | OUTPATIENT
Start: 2024-12-27

## 2024-12-27 NOTE — TELEPHONE ENCOUNTER
Caller: Cliff Janett    Relationship: Self    Best call back number: [phone]     Requested Prescriptions:   Requested Prescriptions     Pending Prescriptions Disp Refills    amphetamine-dextroamphetamine (Adderall) 5 MG tablet 90 tablet 0     Sig: Take 2 tablets by mouth Every Morning AND 1 tablet Every Afternoon.        Pharmacy where request should be sent: [facility] [id number] [place] [address] [address] AT [facility] [address] - [phone]  - [phone] FX     Last office visit with prescribing clinician: 10/4/2024   Last telemedicine visit with prescribing clinician: Visit date not found   Next office visit with prescribing clinician: 2/19/2025     Does the patient have less than a 3 day supply:  [] Yes  [x] No    Would you like a call back once the refill request has been completed: [] Yes [x] No    If the office needs to give you a call back, can they leave a voicemail: [] Yes [x] No    Madalyn Dickinson Rep   12/27/24 10:46 EST

## 2025-01-20 ENCOUNTER — TELEPHONE (OUTPATIENT)
Dept: OBSTETRICS AND GYNECOLOGY | Facility: CLINIC | Age: 40
End: 2025-01-20
Payer: COMMERCIAL

## 2025-02-03 DIAGNOSIS — F90.9 ATTENTION DEFICIT HYPERACTIVITY DISORDER (ADHD), UNSPECIFIED ADHD TYPE: ICD-10-CM

## 2025-02-03 RX ORDER — DEXTROAMPHETAMINE SACCHARATE, AMPHETAMINE ASPARTATE, DEXTROAMPHETAMINE SULFATE AND AMPHETAMINE SULFATE 1.25; 1.25; 1.25; 1.25 MG/1; MG/1; MG/1; MG/1
TABLET ORAL
Qty: 90 TABLET | Refills: 0 | Status: SHIPPED | OUTPATIENT
Start: 2025-02-03

## 2025-02-03 NOTE — TELEPHONE ENCOUNTER
Caller: CoronadoJanett    Relationship: Self    Best call back number: [phone]     Requested Prescriptions:   Requested Prescriptions     Pending Prescriptions Disp Refills    amphetamine-dextroamphetamine (Adderall) 5 MG tablet 90 tablet 0     Sig: Take 2 tablets by mouth Every Morning AND 1 tablet Every Afternoon.        Pharmacy where request should be sent: [facility] [id number] [place] [address] [address] [facility] [address] - [phone]  - [phone] FX     Last office visit with prescribing clinician: 10/4/2024   Last telemedicine visit with prescribing clinician: Visit date not found   Next office visit with prescribing clinician: 2/19/2025     Does the patient have less than a 3 day supply:  [x] Yes  [] No    Would you like a call back once the refill request has been completed: [] Yes [x] No    If the office needs to give you a call back, can they leave a voicemail: [] Yes [x] No    Madalyn Dickinson Rep   02/03/25 10:18 EST

## 2025-02-19 ENCOUNTER — OFFICE VISIT (OUTPATIENT)
Dept: INTERNAL MEDICINE | Facility: CLINIC | Age: 40
End: 2025-02-19
Payer: COMMERCIAL

## 2025-02-19 VITALS
BODY MASS INDEX: 23.91 KG/M2 | DIASTOLIC BLOOD PRESSURE: 91 MMHG | SYSTOLIC BLOOD PRESSURE: 155 MMHG | OXYGEN SATURATION: 99 % | HEART RATE: 72 BPM | HEIGHT: 69 IN | WEIGHT: 161.4 LBS

## 2025-02-19 DIAGNOSIS — R10.9 ABDOMINAL PAIN, UNSPECIFIED ABDOMINAL LOCATION: ICD-10-CM

## 2025-02-19 DIAGNOSIS — D72.819 LEUKOPENIA, UNSPECIFIED TYPE: ICD-10-CM

## 2025-02-19 DIAGNOSIS — F90.9 ATTENTION DEFICIT HYPERACTIVITY DISORDER (ADHD), UNSPECIFIED ADHD TYPE: Primary | ICD-10-CM

## 2025-02-19 PROCEDURE — 99214 OFFICE O/P EST MOD 30 MIN: CPT | Performed by: STUDENT IN AN ORGANIZED HEALTH CARE EDUCATION/TRAINING PROGRAM

## 2025-02-19 NOTE — ASSESSMENT & PLAN NOTE
Psychological condition is stable.  Continue current treatment regimen.  Psychological condition  will be reassessed in 3 months.

## 2025-02-19 NOTE — PROGRESS NOTES
Ildefonso Traore M.D.  Internal Medicine  [facility]  [address], [address]  [place] [address]  [phone]      Chief Complaint  ADHD (Follow Up /)    SUBJECTIVE    History of Present Illness    Janett Coronado is a 39 y.o. female who presents to the office today as an established patient that last saw me on 10/4/2024.       History of Present Illness  The patient came in for a follow-up on ADHD, constipation, headaches, high blood pressure, and concerns about scoliosis in her family.    She mentioned that her abdominal discomfort has gotten better since she started taking psyllium husk once or twice a day, which has helped keep things moving.    Adderall is helping her manage her ADHD symptoms, but she still needs to use other organizational strategies to stay on task and productive. She hasn't noticed any side effects from the medication.    She has been having more frequent, throbbing headaches on the left side of her head, which have been bad enough to wake her up at night. Ibuprofen helps relieve the headaches. She also mentioned that she hasn't been getting enough sleep lately because of life's demands.    She has a family history of scoliosis, with both her father and paternal grandmother affected. She is looking for advice on how to maintain good spinal health to avoid similar issues.    Her blood pressure was a bit high today, which she thinks is due to work-related stress. She plans to monitor it at home. She hasn't been physically active for the past month, which might also be contributing to her elevated blood pressure.        Review of Systems    No Known Allergies     Outpatient Medications Marked as Taking for the 2/19/25 encounter (Office Visit) with Ildefonso Traore MD   Medication Sig Dispense Refill    amphetamine-dextroamphetamine (Adderall) 5 MG tablet Take 2 tablets by mouth Every Morning AND 1 tablet Every Afternoon. 90 tablet 0    norgestimate-ethinyl estradiol (Sprintec  "28) 0.25-35 MG-MCG per tablet Take 1 tablet by mouth Daily. 84 tablet 3        Past Medical History:   Diagnosis Date    Abnormal Pap smear of cervix     ADHD (attention deficit hyperactivity disorder)     Diagnosed in     Difficulty with speech     Heart murmur     Hypertension     during labor    PFO (patent foramen ovale)     Urinary tract infection     > 5 years prior to 2018    Vision changes      Past Surgical History:   Procedure Laterality Date     SECTION      COLONOSCOPY      ORIF ULNAR / RADIAL SHAFT FRACTURE      WISDOM TOOTH EXTRACTION       Family History   Problem Relation Age of Onset    Atrial fibrillation Father     Hypertension Father     No Known Problems Mother     Breast cancer Paternal Grandmother     Cancer Paternal Grandmother 70        breast cancer    Alzheimer's disease Maternal Grandmother     Stroke Maternal Grandmother 70    reports that she has never smoked. She has never been exposed to tobacco smoke. She has never used smokeless tobacco. She reports current alcohol use of about 5.0 standard drinks of alcohol per week. She reports that she does not use drugs.    OBJECTIVE    Vital Signs:   /91   Pulse 72   Ht 175.3 cm (69.02\")   Wt 73.2 kg (161 lb 6.4 oz)   SpO2 99%   BMI 23.82 kg/m²     Physical Exam  Constitutional:       Appearance: Normal appearance.   Cardiovascular:      Rate and Rhythm: Normal rate and regular rhythm.      Heart sounds: Normal heart sounds. No murmur heard.  Pulmonary:      Effort: Pulmonary effort is normal.      Breath sounds: Normal breath sounds.   Musculoskeletal:      Right lower leg: No edema.      Left lower leg: No edema.   Skin:     General: Skin is warm and dry.   Neurological:      Mental Status: She is alert.   Psychiatric:         Behavior: Behavior normal.          Physical Exam      The following data was reviewed by: Ildefonso Traore MD on 2025:  CMP          2024    08:46 10/4/2024    08:52   CMP   Glucose 98  " 93    BUN 8  8    Creatinine 0.80  0.77    EGFR 96.9  100.8    Sodium 136  139    Potassium 4.5  4.8    Chloride 102  105    Calcium 9.1  9.4    Total Protein 6.6  6.7    Albumin 4.4  4.3    Globulin 2.2  2.4    Total Bilirubin 0.3  0.3    Alkaline Phosphatase 48  51    AST (SGOT) 16  16    ALT (SGPT) 10  8    Albumin/Globulin Ratio 2.0  1.8    BUN/Creatinine Ratio 10.0  10.4      CBC w/diff          5/22/2024    08:46 10/4/2024    08:52   CBC w/Diff   WBC 3.66  2.92    RBC 3.97  4.47    Hemoglobin 12.2  13.3    Hematocrit 37.0  39.9    MCV 93.2  89.3    MCH 30.7  29.8    MCHC 33.0  33.3    RDW 11.6  11.5    Platelets 209  214    Neutrophil Rel % 57.7  41.4    Lymphocyte Rel % 31.4  47.3    Monocyte Rel % 9.3  9.2    Eosinophil Rel % 0.8  1.4    Basophil Rel % 0.5  0.7      Lipid Panel          5/22/2024    08:46   Lipid Panel   Total Cholesterol 174    Triglycerides 68    HDL Cholesterol 80    VLDL Cholesterol 13    LDL Cholesterol  81      TSH          5/22/2024    08:46   TSH   TSH 2.340      A1C Last 3 Results          5/22/2024    08:46   HGBA1C Last 3 Results   Hemoglobin A1C 5.50                  ASSESSMENT & PLAN        Attention deficit hyperactivity disorder (ADHD), unspecified ADHD type  Psychological condition is stable.  Continue current treatment regimen.  Psychological condition  will be reassessed in 3 months.         Abdominal pain, unspecified abdominal location  - Likely related constipation  - Improved with psyllium husk  - Continue regimen  - Further workup if condition changes         Leukopenia, unspecified type  - Slightly low WBC count  - Recheck today  Orders:    CBC & Differential        Assessment & Plan          Health Maintenance Due   Topic Date Due    INFLUENZA VACCINE  07/01/2024    COVID-19 Vaccine (6 - 2024-25 season) 09/01/2024        Follow Up  Return in about 3 months (around 5/19/2025) for Recheck.    Patient/family had no further questions at this time and verbalized  understanding of the plan discussed today.     Patient or patient representative verbalized consent for the use of Ambient Listening during the visit with  Ildefonso Traore MD for chart documentation. 2/19/2025  16:33 EST

## 2025-02-20 LAB
BASOPHILS # BLD AUTO: 0.03 10*3/MM3 (ref 0–0.2)
BASOPHILS NFR BLD AUTO: 0.6 % (ref 0–1.5)
EOSINOPHIL # BLD AUTO: 0.05 10*3/MM3 (ref 0–0.4)
EOSINOPHIL NFR BLD AUTO: 1 % (ref 0.3–6.2)
ERYTHROCYTE [DISTWIDTH] IN BLOOD BY AUTOMATED COUNT: 13.3 % (ref 12.3–15.4)
HCT VFR BLD AUTO: 39.6 % (ref 34–46.6)
HGB BLD-MCNC: 13.3 G/DL (ref 12–15.9)
IMM GRANULOCYTES # BLD AUTO: 0.01 10*3/MM3 (ref 0–0.05)
IMM GRANULOCYTES NFR BLD AUTO: 0.2 % (ref 0–0.5)
LYMPHOCYTES # BLD AUTO: 1.91 10*3/MM3 (ref 0.7–3.1)
LYMPHOCYTES NFR BLD AUTO: 37.1 % (ref 19.6–45.3)
MCH RBC QN AUTO: 28.9 PG (ref 26.6–33)
MCHC RBC AUTO-ENTMCNC: 33.6 G/DL (ref 31.5–35.7)
MCV RBC AUTO: 85.9 FL (ref 79–97)
MONOCYTES # BLD AUTO: 0.35 10*3/MM3 (ref 0.1–0.9)
MONOCYTES NFR BLD AUTO: 6.8 % (ref 5–12)
NEUTROPHILS # BLD AUTO: 2.8 10*3/MM3 (ref 1.7–7)
NEUTROPHILS NFR BLD AUTO: 54.3 % (ref 42.7–76)
NRBC BLD AUTO-RTO: 0 /100 WBC (ref 0–0.2)
PLATELET # BLD AUTO: 254 10*3/MM3 (ref 140–450)
RBC # BLD AUTO: 4.61 10*6/MM3 (ref 3.77–5.28)
WBC # BLD AUTO: 5.15 10*3/MM3 (ref 3.4–10.8)

## 2025-03-03 DIAGNOSIS — F90.9 ATTENTION DEFICIT HYPERACTIVITY DISORDER (ADHD), UNSPECIFIED ADHD TYPE: ICD-10-CM

## 2025-03-03 RX ORDER — DEXTROAMPHETAMINE SACCHARATE, AMPHETAMINE ASPARTATE, DEXTROAMPHETAMINE SULFATE AND AMPHETAMINE SULFATE 1.25; 1.25; 1.25; 1.25 MG/1; MG/1; MG/1; MG/1
TABLET ORAL
Qty: 90 TABLET | Refills: 0 | Status: SHIPPED | OUTPATIENT
Start: 2025-03-03

## 2025-03-03 NOTE — TELEPHONE ENCOUNTER
Caller: CoronadoJanett    Relationship: Self  PHONE [phone]     Requested Prescriptions:   Requested Prescriptions     Pending Prescriptions Disp Refills    amphetamine-dextroamphetamine (Adderall) 5 MG tablet 90 tablet 0     Sig: Take 2 tablets by mouth Every Morning AND 1 tablet Every Afternoon.        Pharmacy where request should be sent: [facility] [id number] [facility] [address] AT [facility] [address] - [phone] PH - [phone] FX     Last office visit with prescribing clinician: 2/19/2025   Last telemedicine visit with prescribing clinician: Visit date not found   Next office visit with prescribing clinician: 5/29/2025     Additional details provided by patient: 6 DAYS LEFT    Does the patient have less than a 3 day supply:  [] Yes  [x] No    Would you like a call back once the refill request has been completed: [] Yes [x] No    If the office needs to give you a call back, can they leave a voicemail: [] Yes [x] No    Madalyn Mendoza Rep   03/03/25 10:55 EST

## 2025-04-03 DIAGNOSIS — F90.9 ATTENTION DEFICIT HYPERACTIVITY DISORDER (ADHD), UNSPECIFIED ADHD TYPE: ICD-10-CM

## 2025-04-03 RX ORDER — DEXTROAMPHETAMINE SACCHARATE, AMPHETAMINE ASPARTATE, DEXTROAMPHETAMINE SULFATE AND AMPHETAMINE SULFATE 1.25; 1.25; 1.25; 1.25 MG/1; MG/1; MG/1; MG/1
TABLET ORAL
Qty: 90 TABLET | Refills: 0 | Status: SHIPPED | OUTPATIENT
Start: 2025-04-03

## 2025-04-03 NOTE — TELEPHONE ENCOUNTER
Caller: Janett Coronado    Relationship: Self    Best call back number: [phone]     Requested Prescriptions:   Requested Prescriptions     Pending Prescriptions Disp Refills    amphetamine-dextroamphetamine (Adderall) 5 MG tablet 90 tablet 0     Sig: Take 2 tablets by mouth Every Morning AND 1 tablet Every Afternoon.        Pharmacy where request should be sent: [facility] [id number] [place] [address] AT [facility] [address] - [phone]  - [phone] FX     Last office visit with prescribing clinician: 2/19/2025   Last telemedicine visit with prescribing clinician: Visit date not found   Next office visit with prescribing clinician: 5/29/2025     Additional details provided by patient:     Does the patient have less than a 3 day supply:  [] Yes  [x] No    Would you like a call back once the refill request has been completed: [] Yes [x] No    If the office needs to give you a call back, can they leave a voicemail: [] Yes [x] No    Madalyn Obregon Rep   04/03/25 08:12 EDT

## 2025-04-30 DIAGNOSIS — F90.9 ATTENTION DEFICIT HYPERACTIVITY DISORDER (ADHD), UNSPECIFIED ADHD TYPE: ICD-10-CM

## 2025-04-30 RX ORDER — DEXTROAMPHETAMINE SACCHARATE, AMPHETAMINE ASPARTATE, DEXTROAMPHETAMINE SULFATE AND AMPHETAMINE SULFATE 1.25; 1.25; 1.25; 1.25 MG/1; MG/1; MG/1; MG/1
TABLET ORAL
Qty: 90 TABLET | Refills: 0 | Status: SHIPPED | OUTPATIENT
Start: 2025-04-30

## 2025-04-30 NOTE — TELEPHONE ENCOUNTER
Caller: Janett Coronado    Relationship: Self    Best call back number: [phone]     Requested Prescriptions:   Requested Prescriptions     Pending Prescriptions Disp Refills    amphetamine-dextroamphetamine (Adderall) 5 MG tablet 90 tablet 0     Sig: Take 2 tablets by mouth Every Morning AND 1 tablet Every Afternoon.        Pharmacy where request should be sent: [facility] [id number] [place] [address] AT [facility] [address] - [phone]  - [phone] FX     Last office visit with prescribing clinician: 2/19/2025   Last telemedicine visit with prescribing clinician: Visit date not found   Next office visit with prescribing clinician: 5/29/2025         Madalyn Lopez Rep   04/30/25 10:50 EDT

## 2025-05-29 ENCOUNTER — OFFICE VISIT (OUTPATIENT)
Dept: INTERNAL MEDICINE | Facility: CLINIC | Age: 40
End: 2025-05-29
Payer: COMMERCIAL

## 2025-05-29 VITALS
SYSTOLIC BLOOD PRESSURE: 121 MMHG | OXYGEN SATURATION: 100 % | WEIGHT: 155.6 LBS | DIASTOLIC BLOOD PRESSURE: 80 MMHG | HEART RATE: 75 BPM | BODY MASS INDEX: 23.05 KG/M2 | HEIGHT: 69 IN

## 2025-05-29 DIAGNOSIS — R03.0 ELEVATED BLOOD PRESSURE READING: ICD-10-CM

## 2025-05-29 DIAGNOSIS — F90.9 ATTENTION DEFICIT HYPERACTIVITY DISORDER (ADHD), UNSPECIFIED ADHD TYPE: Primary | ICD-10-CM

## 2025-05-29 DIAGNOSIS — B35.1 ONYCHOMYCOSIS: ICD-10-CM

## 2025-05-29 PROCEDURE — 99214 OFFICE O/P EST MOD 30 MIN: CPT | Performed by: STUDENT IN AN ORGANIZED HEALTH CARE EDUCATION/TRAINING PROGRAM

## 2025-05-29 RX ORDER — DEXTROAMPHETAMINE SACCHARATE, AMPHETAMINE ASPARTATE, DEXTROAMPHETAMINE SULFATE AND AMPHETAMINE SULFATE 2.5; 2.5; 2.5; 2.5 MG/1; MG/1; MG/1; MG/1
10 TABLET ORAL 2 TIMES DAILY
Qty: 60 TABLET | Refills: 0 | Status: SHIPPED | OUTPATIENT
Start: 2025-05-29 | End: 2025-06-28

## 2025-05-29 NOTE — PROGRESS NOTES
Ildefonso Traore M.D.  Internal Medicine  [facility]  [address], [address]  [place] [address]  [phone]      Chief Complaint  ADHD (3 month follow up /)    SUBJECTIVE    History of Present Illness    Janett Coronado is a 39 y.o. female who presents to the office today as an established patient that last saw me on 2/19/2025.     History of Present Illness  The patient came in for a follow-up on ADHD, high blood pressure, and toenail fungus.    ADHD: The patient says their condition is stable and they can manage tasks and work. They report having frequent headaches since starting Sprintec last year, so they stopped taking it and noticed an improvement. They have a history of ocular migraines but don't think they are the cause of the headaches. They are currently Adderall, which works well some days, but they are considering increasing the dosage. They take two tablets in the morning and one in the afternoon, and they have more trouble in the afternoon. The medication does not interfere with their sleep. Her father has hypertension.    Elevated blood pressure: The patient had high blood pressure during their last visit and has been monitoring it at home. They are making lifestyle changes, including walking more and exercising consistently, such as weightlifting. Their readings at home have been between 113-118 systolic and 69-76 diastolic, and it was 126/76 when they donated blood this week.    Toenail fungus: The patient has had toenail fungus for several years, initially on their small toes, and it spread over the winter. Previous topical treatments did not work. They are aware of the side effects of oral medication and are seeking advice. They also experience intermittent athlete's foot.        Review of Systems    No Known Allergies     Outpatient Medications Marked as Taking for the 5/29/25 encounter (Office Visit) with Ildefonso Traore MD   Medication Sig Dispense Refill    [DISCONTINUED]  "amphetamine-dextroamphetamine (Adderall) 5 MG tablet Take 2 tablets by mouth Every Morning AND 1 tablet Every Afternoon. 90 tablet 0        Past Medical History:   Diagnosis Date    Abnormal Pap smear of cervix     ADHD (attention deficit hyperactivity disorder)     Diagnosed in     Difficulty with speech     Heart murmur     Hypertension     during labor    PFO (patent foramen ovale)     Urinary tract infection     > 5 years prior to 2018    Vision changes      Past Surgical History:   Procedure Laterality Date     SECTION      COLONOSCOPY      ORIF ULNAR / RADIAL SHAFT FRACTURE      WISDOM TOOTH EXTRACTION       Family History   Problem Relation Age of Onset    Atrial fibrillation Father     Hypertension Father     No Known Problems Mother     Breast cancer Paternal Grandmother     Cancer Paternal Grandmother 70        breast cancer    Alzheimer's disease Maternal Grandmother     Stroke Maternal Grandmother 70    reports that she has never smoked. She has never been exposed to tobacco smoke. She has never used smokeless tobacco. She reports current alcohol use of about 5.0 standard drinks of alcohol per week. She reports that she does not use drugs.    OBJECTIVE    Vital Signs:   /80   Pulse 75   Ht 175.3 cm (69.02\")   Wt 70.6 kg (155 lb 9.6 oz)   SpO2 100%   BMI 22.97 kg/m²     Physical Exam     Physical Exam  General Appearance: Normal.  HEENT: Within normal limits.  Respiratory: Clear to auscultation, no wheezing, rales or rhonchi.  Cardiovascular: Regular rate and rhythm, no murmurs, rubs, or gallops.  Skin: Warm and dry, no rash.  Neurological: Grossly intact.    Painted toenails    The following data was reviewed by: Ildefonso Traore MD on 2025:  CMP          10/4/2024    08:52   CMP   Glucose 93    BUN 8    Creatinine 0.77    EGFR 100.8    Sodium 139    Potassium 4.8    Chloride 105    Calcium 9.4    Total Protein 6.7    Albumin 4.3    Globulin 2.4    Total Bilirubin 0.3  "   Alkaline Phosphatase 51    AST (SGOT) 16    ALT (SGPT) 8    Albumin/Globulin Ratio 1.8    BUN/Creatinine Ratio 10.4      CBC w/diff          10/4/2024    08:52 2/19/2025    15:55   CBC w/Diff   WBC 2.92  5.15    RBC 4.47  4.61    Hemoglobin 13.3  13.3    Hematocrit 39.9  39.6    MCV 89.3  85.9    MCH 29.8  28.9    MCHC 33.3  33.6    RDW 11.5  13.3    Platelets 214  254    Neutrophil Rel % 41.4  54.3    Lymphocyte Rel % 47.3  37.1    Monocyte Rel % 9.2  6.8    Eosinophil Rel % 1.4  1.0    Basophil Rel % 0.7  0.6                      ASSESSMENT & PLAN        Attention deficit hyperactivity disorder (ADHD), unspecified ADHD type  Psychological condition is stable.  Continue current treatment regimen.  Psychological condition  will be reassessed in 3 months.  - Current dose of Adderall not consistently effective, particularly in the afternoon  - Increase Adderall to 10 mg twice daily    Orders:    amphetamine-dextroamphetamine (Adderall) 10 MG tablet; Take 1 tablet by mouth 2 (Two) Times a Day for 30 days.     Onychomycosis  - Slightly elevated during last visit and today  - Home readings 113-118/69-76 mmHg; Adderall can raise blood pressure  - Rechecked today: 120/80 mmHg  - Continue monthly monitoring and current lifestyle modifications       Elevated blood pressure reading  - Present for several years, spread over winter  - Previous topical treatments ineffective  - Oral antifungal medication discussed, requires liver function monitoring due to hepatotoxicity  - Consider risks and benefits, discuss further at next appointment         Assessment & Plan        Health Maintenance Due   Topic Date Due    COVID-19 Vaccine (6 - 2024-25 season) 09/01/2024        Follow Up  Return in about 3 months (around 8/29/2025) for Recheck.    Patient/family had no further questions at this time and verbalized understanding of the plan discussed today.     Patient or patient representative verbalized consent for the use of Ambient  Listening during the visit with  Ildefonso Traore MD for chart documentation. 5/29/2025  20:13 EDT

## 2025-05-29 NOTE — ASSESSMENT & PLAN NOTE
Psychological condition is stable.  Continue current treatment regimen.  Psychological condition  will be reassessed in 3 months.  - Current dose of Adderall not consistently effective, particularly in the afternoon  - Increase Adderall to 10 mg twice daily    Orders:    amphetamine-dextroamphetamine (Adderall) 10 MG tablet; Take 1 tablet by mouth 2 (Two) Times a Day for 30 days.

## 2025-07-07 DIAGNOSIS — F90.9 ATTENTION DEFICIT HYPERACTIVITY DISORDER (ADHD), UNSPECIFIED ADHD TYPE: ICD-10-CM

## 2025-07-07 RX ORDER — DEXTROAMPHETAMINE SACCHARATE, AMPHETAMINE ASPARTATE, DEXTROAMPHETAMINE SULFATE AND AMPHETAMINE SULFATE 2.5; 2.5; 2.5; 2.5 MG/1; MG/1; MG/1; MG/1
10 TABLET ORAL 2 TIMES DAILY
Qty: 60 TABLET | Refills: 0 | Status: SHIPPED | OUTPATIENT
Start: 2025-07-07 | End: 2025-08-06

## 2025-07-07 NOTE — TELEPHONE ENCOUNTER
Caller: Janett Coronado    Relationship: Self    Best call back number:     [phone]       Requested Prescriptions:   Requested Prescriptions     Pending Prescriptions Disp Refills    amphetamine-dextroamphetamine (Adderall) 10 MG tablet 60 tablet 0     Sig: Take 1 tablet by mouth 2 (Two) Times a Day for 30 days.        Pharmacy where request should be sent: [facility] # [id number] [address] [phone]  - [phone] FX     Last office visit with prescribing clinician: 5/29/2025   Last telemedicine visit with prescribing clinician: Visit date not found   Next office visit with prescribing clinician: 8/19/2025       Does the patient have less than a 3 day supply:  [] Yes  [x] No    Madalyn Jackson   07/07/25 13:12 EDT

## 2025-08-11 DIAGNOSIS — F90.9 ATTENTION DEFICIT HYPERACTIVITY DISORDER (ADHD), UNSPECIFIED ADHD TYPE: ICD-10-CM

## 2025-08-11 RX ORDER — DEXTROAMPHETAMINE SACCHARATE, AMPHETAMINE ASPARTATE, DEXTROAMPHETAMINE SULFATE AND AMPHETAMINE SULFATE 2.5; 2.5; 2.5; 2.5 MG/1; MG/1; MG/1; MG/1
10 TABLET ORAL 2 TIMES DAILY
Qty: 60 TABLET | Refills: 0 | Status: SHIPPED | OUTPATIENT
Start: 2025-08-11 | End: 2025-09-10

## 2025-08-12 ENCOUNTER — LAB (OUTPATIENT)
Dept: INTERNAL MEDICINE | Facility: CLINIC | Age: 40
End: 2025-08-12
Payer: COMMERCIAL

## 2025-08-12 DIAGNOSIS — F90.9 ATTENTION DEFICIT HYPERACTIVITY DISORDER (ADHD), UNSPECIFIED ADHD TYPE: Primary | ICD-10-CM

## 2025-08-12 DIAGNOSIS — Z00.00 ANNUAL PHYSICAL EXAM: ICD-10-CM

## 2025-08-14 LAB
ALBUMIN SERPL-MCNC: 4.4 G/DL (ref 3.5–5.2)
ALBUMIN/GLOB SERPL: 1.8 G/DL
ALP SERPL-CCNC: 70 U/L (ref 39–117)
ALT SERPL-CCNC: 10 U/L (ref 1–33)
AMPHETAMINES UR QL SCN: NEGATIVE NG/ML
AST SERPL-CCNC: 21 U/L (ref 1–32)
BARBITURATES UR QL SCN: NEGATIVE NG/ML
BASOPHILS # BLD AUTO: NORMAL 10*3/UL
BENZODIAZ UR QL SCN: NEGATIVE NG/ML
BILIRUB SERPL-MCNC: 0.2 MG/DL (ref 0–1.2)
BUN SERPL-MCNC: 11 MG/DL (ref 6–20)
BUN/CREAT SERPL: 14.3 (ref 7–25)
BZE UR QL SCN: NEGATIVE NG/ML
CALCIUM SERPL-MCNC: 9.5 MG/DL (ref 8.6–10.5)
CANNABINOIDS UR QL SCN: NEGATIVE NG/ML
CHLORIDE SERPL-SCNC: 102 MMOL/L (ref 98–107)
CHOLEST SERPL-MCNC: 188 MG/DL (ref 0–200)
CO2 SERPL-SCNC: 24.1 MMOL/L (ref 22–29)
CREAT SERPL-MCNC: 0.77 MG/DL (ref 0.57–1)
CREAT UR-MCNC: 45.3 MG/DL (ref 20–300)
DIFFERENTIAL COMMENT: ABNORMAL
EGFRCR SERPLBLD CKD-EPI 2021: 100.8 ML/MIN/1.73
EOSINOPHIL # BLD AUTO: NORMAL 10*3/UL
EOSINOPHIL NFR BLD AUTO: NORMAL %
ERYTHROCYTE [DISTWIDTH] IN BLOOD BY AUTOMATED COUNT: 13.2 % (ref 12.3–15.4)
GLOBULIN SER CALC-MCNC: 2.5 GM/DL
GLUCOSE SERPL-MCNC: 109 MG/DL (ref 65–99)
HBA1C MFR BLD: 5.2 % (ref 4.8–5.6)
HCT VFR BLD AUTO: 38.4 % (ref 34–46.6)
HDLC SERPL-MCNC: 86 MG/DL (ref 40–60)
HGB BLD-MCNC: 12.1 G/DL (ref 12–15.9)
LABORATORY COMMENT REPORT: NORMAL
LDLC SERPL CALC-MCNC: 94 MG/DL (ref 0–100)
LYMPHOCYTES # BLD AUTO: NORMAL 10*3/UL
LYMPHOCYTES # BLD MANUAL: 1.8 10*3/MM3 (ref 0.7–3.1)
LYMPHOCYTES NFR BLD AUTO: NORMAL %
LYMPHOCYTES NFR BLD MANUAL: 51 % (ref 19.6–45.3)
MCH RBC QN AUTO: 28.6 PG (ref 26.6–33)
MCHC RBC AUTO-ENTMCNC: 31.5 G/DL (ref 31.5–35.7)
MCV RBC AUTO: 90.8 FL (ref 79–97)
METHADONE UR QL SCN: NEGATIVE NG/ML
MONOCYTES # BLD MANUAL: 0.42 10*3/MM3 (ref 0.1–0.9)
MONOCYTES NFR BLD AUTO: NORMAL %
MONOCYTES NFR BLD MANUAL: 12 % (ref 5–12)
NEUTROPHILS # BLD MANUAL: 1.31 10*3/MM3 (ref 1.7–7)
NEUTROPHILS NFR BLD AUTO: NORMAL %
NEUTROPHILS NFR BLD MANUAL: 37 % (ref 42.7–76)
OPIATES UR QL SCN: NEGATIVE NG/ML
OXYCODONE+OXYMORPHONE UR QL SCN: NEGATIVE NG/ML
PCP UR QL: NEGATIVE NG/ML
PH UR: 6.3 [PH] (ref 4.5–8.9)
PLATELET # BLD AUTO: 230 10*3/MM3 (ref 140–450)
PLATELET BLD QL SMEAR: ABNORMAL
POTASSIUM SERPL-SCNC: 4.7 MMOL/L (ref 3.5–5.2)
PROPOXYPH UR QL SCN: NEGATIVE NG/ML
PROT SERPL-MCNC: 6.9 G/DL (ref 6–8.5)
RBC # BLD AUTO: 4.23 10*6/MM3 (ref 3.77–5.28)
RBC MORPH BLD: ABNORMAL
SODIUM SERPL-SCNC: 136 MMOL/L (ref 136–145)
TRIGL SERPL-MCNC: 36 MG/DL (ref 0–150)
TSH SERPL DL<=0.005 MIU/L-ACNC: 2.55 UIU/ML (ref 0.27–4.2)
VLDLC SERPL CALC-MCNC: 8 MG/DL (ref 5–40)
WBC # BLD AUTO: 3.53 10*3/MM3 (ref 3.4–10.8)

## 2025-08-19 ENCOUNTER — OFFICE VISIT (OUTPATIENT)
Dept: INTERNAL MEDICINE | Facility: CLINIC | Age: 40
End: 2025-08-19
Payer: COMMERCIAL

## 2025-08-19 VITALS
WEIGHT: 158.6 LBS | SYSTOLIC BLOOD PRESSURE: 136 MMHG | HEART RATE: 70 BPM | BODY MASS INDEX: 23.49 KG/M2 | DIASTOLIC BLOOD PRESSURE: 80 MMHG | HEIGHT: 69 IN | OXYGEN SATURATION: 98 %

## 2025-08-19 DIAGNOSIS — Z00.00 ANNUAL PHYSICAL EXAM: Primary | ICD-10-CM

## 2025-08-19 DIAGNOSIS — Z79.899 HIGH RISK MEDICATION USE: ICD-10-CM

## 2025-08-19 DIAGNOSIS — Z12.31 ENCOUNTER FOR SCREENING MAMMOGRAM FOR MALIGNANT NEOPLASM OF BREAST: ICD-10-CM

## 2025-08-19 DIAGNOSIS — F90.0 ATTENTION DEFICIT HYPERACTIVITY DISORDER (ADHD), PREDOMINANTLY INATTENTIVE TYPE: ICD-10-CM

## 2025-08-19 DIAGNOSIS — R03.0 ELEVATED BLOOD PRESSURE READING: ICD-10-CM

## 2025-08-19 DIAGNOSIS — L65.9 HAIR LOSS: ICD-10-CM

## 2025-08-19 DIAGNOSIS — B35.1 ONYCHOMYCOSIS: ICD-10-CM
